# Patient Record
Sex: FEMALE | Race: WHITE | NOT HISPANIC OR LATINO | Employment: FULL TIME | URBAN - METROPOLITAN AREA
[De-identification: names, ages, dates, MRNs, and addresses within clinical notes are randomized per-mention and may not be internally consistent; named-entity substitution may affect disease eponyms.]

---

## 2021-06-02 ENCOUNTER — RECORDS - HEALTHEAST (OUTPATIENT)
Dept: ADMINISTRATIVE | Facility: CLINIC | Age: 26
End: 2021-06-02

## 2022-08-11 ENCOUNTER — LAB REQUISITION (OUTPATIENT)
Dept: LAB | Facility: CLINIC | Age: 27
End: 2022-08-11
Payer: COMMERCIAL

## 2022-08-11 DIAGNOSIS — N91.2 AMENORRHEA, UNSPECIFIED: ICD-10-CM

## 2022-08-11 LAB — HCG INTACT+B SERPL-ACNC: 967 MIU/ML

## 2022-08-11 PROCEDURE — 84702 CHORIONIC GONADOTROPIN TEST: CPT | Mod: ORL | Performed by: OBSTETRICS & GYNECOLOGY

## 2022-08-15 ENCOUNTER — LAB REQUISITION (OUTPATIENT)
Dept: LAB | Facility: CLINIC | Age: 27
End: 2022-08-15
Payer: COMMERCIAL

## 2022-08-15 DIAGNOSIS — Z32.01 ENCOUNTER FOR PREGNANCY TEST, RESULT POSITIVE: ICD-10-CM

## 2022-08-15 LAB — HCG INTACT+B SERPL-ACNC: 3442 MIU/ML

## 2022-08-15 PROCEDURE — 84702 CHORIONIC GONADOTROPIN TEST: CPT | Mod: ORL | Performed by: OBSTETRICS & GYNECOLOGY

## 2022-09-01 ENCOUNTER — HOSPITAL ENCOUNTER (EMERGENCY)
Facility: CLINIC | Age: 27
Discharge: HOME | End: 2022-09-01
Attending: EMERGENCY MEDICINE | Admitting: EMERGENCY MEDICINE
Payer: COMMERCIAL

## 2022-09-01 ENCOUNTER — APPOINTMENT (OUTPATIENT)
Dept: ULTRASOUND IMAGING | Facility: CLINIC | Age: 27
End: 2022-09-01
Attending: EMERGENCY MEDICINE
Payer: COMMERCIAL

## 2022-09-01 VITALS
DIASTOLIC BLOOD PRESSURE: 79 MMHG | SYSTOLIC BLOOD PRESSURE: 160 MMHG | WEIGHT: 215 LBS | BODY MASS INDEX: 40.59 KG/M2 | HEART RATE: 87 BPM | OXYGEN SATURATION: 95 % | RESPIRATION RATE: 18 BRPM | TEMPERATURE: 99.6 F | HEIGHT: 61 IN

## 2022-09-01 DIAGNOSIS — O20.9 VAGINAL BLEEDING AFFECTING EARLY PREGNANCY: ICD-10-CM

## 2022-09-01 LAB
ABO/RH(D): NORMAL
ANION GAP SERPL CALCULATED.3IONS-SCNC: 6 MMOL/L (ref 3–14)
ANTIBODY SCREEN: NEGATIVE
B-HCG SERPL-ACNC: ABNORMAL IU/L (ref 0–5)
BASOPHILS # BLD AUTO: 0 10E3/UL (ref 0–0.2)
BASOPHILS NFR BLD AUTO: 0 %
BUN SERPL-MCNC: 8 MG/DL (ref 7–30)
CALCIUM SERPL-MCNC: 8.8 MG/DL (ref 8.5–10.1)
CHLORIDE BLD-SCNC: 109 MMOL/L (ref 94–109)
CO2 SERPL-SCNC: 22 MMOL/L (ref 20–32)
CREAT SERPL-MCNC: 0.54 MG/DL (ref 0.52–1.04)
EOSINOPHIL # BLD AUTO: 0.2 10E3/UL (ref 0–0.7)
EOSINOPHIL NFR BLD AUTO: 3 %
ERYTHROCYTE [DISTWIDTH] IN BLOOD BY AUTOMATED COUNT: 12.5 % (ref 10–15)
GFR SERPL CREATININE-BSD FRML MDRD: >90 ML/MIN/1.73M2
GLUCOSE BLD-MCNC: 96 MG/DL (ref 70–99)
HCT VFR BLD AUTO: 37.8 % (ref 35–47)
HGB BLD-MCNC: 13.1 G/DL (ref 11.7–15.7)
IMM GRANULOCYTES # BLD: 0 10E3/UL
IMM GRANULOCYTES NFR BLD: 0 %
LYMPHOCYTES # BLD AUTO: 2 10E3/UL (ref 0.8–5.3)
LYMPHOCYTES NFR BLD AUTO: 25 %
MCH RBC QN AUTO: 31.1 PG (ref 26.5–33)
MCHC RBC AUTO-ENTMCNC: 34.7 G/DL (ref 31.5–36.5)
MCV RBC AUTO: 90 FL (ref 78–100)
MONOCYTES # BLD AUTO: 0.4 10E3/UL (ref 0–1.3)
MONOCYTES NFR BLD AUTO: 6 %
NEUTROPHILS # BLD AUTO: 5.2 10E3/UL (ref 1.6–8.3)
NEUTROPHILS NFR BLD AUTO: 66 %
NRBC # BLD AUTO: 0 10E3/UL
NRBC BLD AUTO-RTO: 0 /100
PLATELET # BLD AUTO: 209 10E3/UL (ref 150–450)
POTASSIUM BLD-SCNC: 3.6 MMOL/L (ref 3.4–5.3)
RBC # BLD AUTO: 4.21 10E6/UL (ref 3.8–5.2)
SODIUM SERPL-SCNC: 137 MMOL/L (ref 133–144)
SPECIMEN EXPIRATION DATE: NORMAL
WBC # BLD AUTO: 7.9 10E3/UL (ref 4–11)

## 2022-09-01 PROCEDURE — 82310 ASSAY OF CALCIUM: CPT | Performed by: EMERGENCY MEDICINE

## 2022-09-01 PROCEDURE — 76801 OB US < 14 WKS SINGLE FETUS: CPT

## 2022-09-01 PROCEDURE — 86850 RBC ANTIBODY SCREEN: CPT | Performed by: EMERGENCY MEDICINE

## 2022-09-01 PROCEDURE — 85025 COMPLETE CBC W/AUTO DIFF WBC: CPT | Performed by: EMERGENCY MEDICINE

## 2022-09-01 PROCEDURE — 99285 EMERGENCY DEPT VISIT HI MDM: CPT | Mod: 25

## 2022-09-01 PROCEDURE — 36415 COLL VENOUS BLD VENIPUNCTURE: CPT | Performed by: EMERGENCY MEDICINE

## 2022-09-01 PROCEDURE — 84702 CHORIONIC GONADOTROPIN TEST: CPT | Performed by: EMERGENCY MEDICINE

## 2022-09-01 PROCEDURE — 86901 BLOOD TYPING SEROLOGIC RH(D): CPT | Performed by: EMERGENCY MEDICINE

## 2022-09-01 ASSESSMENT — ENCOUNTER SYMPTOMS
LIGHT-HEADEDNESS: 0
FEVER: 0

## 2022-09-01 ASSESSMENT — ACTIVITIES OF DAILY LIVING (ADL)
ADLS_ACUITY_SCORE: 35
ADLS_ACUITY_SCORE: 35

## 2022-09-01 NOTE — ED PROVIDER NOTES
"  History   Chief Complaint:  Vaginal Bleeding - Pregnant       HPI   Judi Parisi is a  8 week pregnant 27 year old female who presents with vaginal bleeding since 1130 along. She has a reassuring US last week and has been seen by her OB GYN for this pregnancy.  She relays associated cramping.  She denies fevers or lightheadedness.  There are no further aggravating or alleviating factors no further associated symptoms.    Review of Systems   Constitutional: Negative for fever.   Genitourinary: Positive for vaginal bleeding.   Neurological: Negative for light-headedness.   All other systems reviewed and are negative.    Allergies:  No known drug allergies    Medications:  adderall     Past Medical History:     ADHD    Social History:  The patient presents to the ED with boyfriend    Physical Exam     Patient Vitals for the past 24 hrs:   BP Temp Temp src Pulse Resp SpO2 Height Weight   22 1148 -- -- -- 87 -- -- -- --   22 1147 (!) 160/79 99.6  F (37.6  C) Temporal -- 18 95 % 1.549 m (5' 1\") 97.5 kg (215 lb)       Physical Exam  General: Alert, interactive in mild distress  Head:  Scalp is atraumatic  Eyes:  The pupils are equal, round, and reactive to light    EOM's intact    No scleral icterus  ENT:      Nose:  The external nose is normal  Ears:  External ears are normal     Neck:  Normal range of motion.      There is no rigidity.    Trachea is in the midline         CV:  Regular rate and rhythm    No murmur   Resp:  Breath sounds are clear bilaterally    Non-labored, no retractions or accessory muscle use      GI:  Abdomen is soft, no distension, no tenderness.       MS:  Normal strength in all 4 extremities  Skin:  Warm and dry, No rash or lesions noted.  Neuro: Strength 5/5 x4.      GCS: 15  Psych:  Awake. Alert.  Normal affect.      Appropriate interactions.    Emergency Department Course   Imaging:  US OB <14 Weeks w Transvaginal   Preliminary Result   IMPRESSION:    1. Early single live " intrauterine pregnancy of approximately 7 weeks 4   days gestation. EDC is 4/16/2023.   2. Subchorionic hemorrhage identified.        Report per radiology    Laboratory:  Labs Ordered and Resulted from Time of ED Arrival to Time of ED Departure   HCG QUANTITATIVE PREGNANCY - Abnormal       Result Value    hCG Quantitative 35,017 (*)    BASIC METABOLIC PANEL - Normal    Sodium 137      Potassium 3.6      Chloride 109      Carbon Dioxide (CO2) 22      Anion Gap 6      Urea Nitrogen 8      Creatinine 0.54      Calcium 8.8      Glucose 96      GFR Estimate >90     CBC WITH PLATELETS AND DIFFERENTIAL    WBC Count 7.9      RBC Count 4.21      Hemoglobin 13.1      Hematocrit 37.8      MCV 90      MCH 31.1      MCHC 34.7      RDW 12.5      Platelet Count 209      % Neutrophils 66      % Lymphocytes 25      % Monocytes 6      % Eosinophils 3      % Basophils 0      % Immature Granulocytes 0      NRBCs per 100 WBC 0      Absolute Neutrophils 5.2      Absolute Lymphocytes 2.0      Absolute Monocytes 0.4      Absolute Eosinophils 0.2      Absolute Basophils 0.0      Absolute Immature Granulocytes 0.0      Absolute NRBCs 0.0     TYPE AND SCREEN, ADULT    ABO/RH(D) A POS      Antibody Screen Negative      SPECIMEN EXPIRATION DATE [id number]     ABO/RH TYPE AND SCREEN        Emergency Department Course:  Reviewed:  I reviewed nursing notes, vitals, past medical history and Care Everywhere    Assessments:  1158 I obtained history and examined the patient as noted above.   1342 I rechecked the patient and explained findings.     Disposition:  The patient was discharged to home.     Impression & Plan   Medical Decision Making:    Judi Parisi is a 27 year old female who presents for evaluation of vaginal bleeding in early pregnancy.  There is a subchorionic hemorrhage.  In this patient, there are no signs of serious etiologies of abdominal pain.  Supportive outpatient management is therefore indicated.  Plan is home, close  follow-up with OB, threatened miscarriage precautions, and return to ED for worsening pain, heavy vaginal bleeding (more than 1 pad soaked every hour).  Questions were answered.      Diagnosis:    ICD-10-CM    1. Vaginal bleeding affecting early pregnancy  O20.9    2. Subchorionic hemorrhage of placenta in first trimester, single or unspecified fetus  O41.8X10     O46.8X1        Scribe Disclosure:  I, Celestine Becerril, am serving as a scribe at 11:56 AM on 9/1/2022 to document services personally performed by Mj Dale, based on my observations and the provider's statements to me.         Mj Dale MD  09/01/22 8230

## 2022-09-01 NOTE — DISCHARGE INSTRUCTIONS
Discharge Instructions  Vaginal Bleeding in Pregnancy    Bleeding in early pregnancy can be a sign of a miscarriage or an abnormal pregnancy, but often is innocent and the pregnancy will continue normally. We may do blood pregnancy tests and ultrasound to try to determine what is causing the bleeding, but sometimes we are unable to tell. If this is the case, it will often require more time, more blood tests, and another ultrasound.     Generally, every Emergency Department visit should have a follow-up clinic visit with either a primary or a specialty clinic/provider. Please follow-up as instructed by your emergency provider today.    Return to the Emergency Department if:  You have severe abdominal (belly) or pelvic pain.  You faint, or feel lightheaded or dizzy.   Your bleeding gets much worse.  You pass any tissue--solid material that does not look like a blood clot. If you pass tissue, save it (even if you have to pull it out of the toilet) and put it in a plastic bag or jar and bring it in.    You have a fever of 100.5 F or higher.  If no pregnancy could be seen:  It may be that everything is normal and it is just too early to see the pregnancy. It is also possible that you could have an ectopic pregnancy, which is a pregnancy in an abnormal location, such as in the tube ( tubal pregnancy ). We don t see evidence that this is likely today but we cannot exclude it with certainty until a pregnancy can be seen in the uterus (womb) and an ectopic pregnancy can cause severe internal bleeding or death so follow-up is very important.  You should not be alone, in case you suddenly become very sick.   You should not have sex or put anything in your vagina.     If a pregnancy was seen in your uterus:  If a heartbeat could be seen, the chance of miscarriage is lower but because you had bleeding the chance of a miscarriage still exists.  You should not have sex or put anything in your vagina.  Follow-up as directed with  your OB/GYN provider.     Facts about miscarriage: We hope you do not have a miscarriage, but if you do, here are important things to know:  Early miscarriage is very common, and having one miscarriage does not mean you will have problems with another pregnancy.  Nothing you did caused it. Taking medicine, drinking alcohol, having sex, exercising, or falling down will not cause a miscarriage.     If you were given a prescription for medicine here today, be sure to read all of the information (including the package insert) that comes with your prescription.  This will include important information about the medicine, its side effects, and any warnings that you need to know about.  The pharmacist who fills the prescription can provide more information and answer questions you may have about the medicine.  If you have questions or concerns that the pharmacist cannot address, please call or return to the Emergency Department.   Remember that you can always come back to the Emergency Department if you are not able to see your regular provider in the amount of time listed above, if you get any new symptoms, or if there is anything that worries you.

## 2022-09-01 NOTE — ED TRIAGE NOTES
Pt is approx 8 weeks pregnant and started having bright red bleeding this morning. Some cramping. Had ultrasound on Monday and everything was good.      Triage Assessment     Row Name 09/01/22 1147       Respiratory WDL    Respiratory WDL WDL       Cardiac WDL    Cardiac WDL WDL       Cognitive/Neuro/Behavioral WDL    Cognitive/Neuro/Behavioral WDL WDL

## 2022-09-27 ENCOUNTER — LAB REQUISITION (OUTPATIENT)
Dept: LAB | Facility: CLINIC | Age: 27
End: 2022-09-27
Payer: COMMERCIAL

## 2022-09-27 DIAGNOSIS — Z33.1 PREGNANT STATE, INCIDENTAL: ICD-10-CM

## 2022-09-27 LAB
ERYTHROCYTE [DISTWIDTH] IN BLOOD BY AUTOMATED COUNT: 12.7 % (ref 10–15)
HBV SURFACE AG SERPL QL IA: NONREACTIVE
HCT VFR BLD AUTO: 37.2 % (ref 35–47)
HCV AB SERPL QL IA: NONREACTIVE
HGB BLD-MCNC: 12.9 G/DL (ref 11.7–15.7)
HIV 1+2 AB+HIV1 P24 AG SERPL QL IA: NONREACTIVE
MCH RBC QN AUTO: 31.4 PG (ref 26.5–33)
MCHC RBC AUTO-ENTMCNC: 34.7 G/DL (ref 31.5–36.5)
MCV RBC AUTO: 91 FL (ref 78–100)
PLATELET # BLD AUTO: 227 10E3/UL (ref 150–450)
RBC # BLD AUTO: 4.11 10E6/UL (ref 3.8–5.2)
T PALLIDUM AB SER QL: NONREACTIVE
WBC # BLD AUTO: 8.8 10E3/UL (ref 4–11)

## 2022-09-27 PROCEDURE — 85027 COMPLETE CBC AUTOMATED: CPT | Mod: ORL | Performed by: OBSTETRICS & GYNECOLOGY

## 2022-09-27 PROCEDURE — 86803 HEPATITIS C AB TEST: CPT | Mod: ORL | Performed by: OBSTETRICS & GYNECOLOGY

## 2022-09-27 PROCEDURE — 86780 TREPONEMA PALLIDUM: CPT | Mod: ORL | Performed by: OBSTETRICS & GYNECOLOGY

## 2022-09-27 PROCEDURE — 86762 RUBELLA ANTIBODY: CPT | Mod: ORL | Performed by: OBSTETRICS & GYNECOLOGY

## 2022-09-27 PROCEDURE — 87340 HEPATITIS B SURFACE AG IA: CPT | Mod: ORL | Performed by: OBSTETRICS & GYNECOLOGY

## 2022-09-27 PROCEDURE — 87389 HIV-1 AG W/HIV-1&-2 AB AG IA: CPT | Mod: ORL | Performed by: OBSTETRICS & GYNECOLOGY

## 2022-09-27 PROCEDURE — 87086 URINE CULTURE/COLONY COUNT: CPT | Mod: ORL | Performed by: OBSTETRICS & GYNECOLOGY

## 2022-09-28 LAB
BACTERIA UR CULT: ABNORMAL
BACTERIA UR CULT: ABNORMAL
RUBV IGG SERPL QL IA: 4.87 INDEX
RUBV IGG SERPL QL IA: POSITIVE

## 2022-10-25 ENCOUNTER — LAB REQUISITION (OUTPATIENT)
Dept: LAB | Facility: CLINIC | Age: 27
End: 2022-10-25
Payer: COMMERCIAL

## 2022-10-25 DIAGNOSIS — Z33.1 PREGNANT STATE, INCIDENTAL: ICD-10-CM

## 2022-10-25 LAB
ABO/RH(D): NORMAL
ANTIBODY SCREEN: NEGATIVE
SPECIMEN EXPIRATION DATE: NORMAL
SPECIMEN EXPIRATION DATE: NORMAL

## 2022-10-25 PROCEDURE — 86901 BLOOD TYPING SEROLOGIC RH(D): CPT | Mod: ORL | Performed by: OBSTETRICS & GYNECOLOGY

## 2022-10-25 PROCEDURE — 86850 RBC ANTIBODY SCREEN: CPT | Mod: ORL | Performed by: OBSTETRICS & GYNECOLOGY

## 2023-01-31 ENCOUNTER — TRANSFERRED RECORDS (OUTPATIENT)
Dept: HEALTH INFORMATION MANAGEMENT | Facility: CLINIC | Age: 28
End: 2023-01-31

## 2023-03-06 ENCOUNTER — HOSPITAL ENCOUNTER (INPATIENT)
Facility: HOSPITAL | Age: 28
LOS: 5 days | Discharge: HOME | End: 2023-03-11
Attending: OBSTETRICS & GYNECOLOGY | Admitting: OBSTETRICS & GYNECOLOGY
Payer: COMMERCIAL

## 2023-03-06 ENCOUNTER — APPOINTMENT (OUTPATIENT)
Dept: RADIOLOGY | Facility: HOSPITAL | Age: 28
End: 2023-03-06
Attending: OBSTETRICS & GYNECOLOGY
Payer: COMMERCIAL

## 2023-03-06 ENCOUNTER — HOSPITAL ENCOUNTER (EMERGENCY)
Facility: HOSPITAL | Age: 28
End: 2023-03-06
Payer: COMMERCIAL

## 2023-03-06 DIAGNOSIS — O14.13 SEVERE PRE-ECLAMPSIA IN THIRD TRIMESTER: Primary | ICD-10-CM

## 2023-03-06 PROBLEM — O14.10 SEVERE PREECLAMPSIA: Status: ACTIVE | Noted: 2023-03-06

## 2023-03-06 LAB
ABO/RH(D): NORMAL
ALBUMIN MFR UR ELPH: 169.2 MG/DL (ref 1–14)
ALT SERPL W P-5'-P-CCNC: 127 U/L (ref 10–35)
ANTIBODY SCREEN: NEGATIVE
AST SERPL W P-5'-P-CCNC: 86 U/L (ref 10–35)
CREAT SERPL-MCNC: 0.68 MG/DL (ref 0.51–0.95)
CREAT UR-MCNC: 29.5 MG/DL
ERYTHROCYTE [DISTWIDTH] IN BLOOD BY AUTOMATED COUNT: 13.4 % (ref 10–15)
GFR SERPL CREATININE-BSD FRML MDRD: >90 ML/MIN/1.73M2
HCT VFR BLD AUTO: 38.3 % (ref 35–47)
HGB BLD-MCNC: 13.5 G/DL (ref 11.7–15.7)
MCH RBC QN AUTO: 32.1 PG (ref 26.5–33)
MCHC RBC AUTO-ENTMCNC: 35.2 G/DL (ref 31.5–36.5)
MCV RBC AUTO: 91 FL (ref 78–100)
PLATELET # BLD AUTO: 177 10E3/UL (ref 150–450)
PROT/CREAT 24H UR: 5.74 MG/MG CR (ref 0–0.2)
RBC # BLD AUTO: 4.21 10E6/UL (ref 3.8–5.2)
SPECIMEN EXPIRATION DATE: NORMAL
WBC # BLD AUTO: 12.8 10E3/UL (ref 4–11)

## 2023-03-06 PROCEDURE — 250N000013 HC RX MED GY IP 250 OP 250 PS 637: Performed by: OBSTETRICS & GYNECOLOGY

## 2023-03-06 PROCEDURE — 3E0P7VZ INTRODUCTION OF HORMONE INTO FEMALE REPRODUCTIVE, VIA NATURAL OR ARTIFICIAL OPENING: ICD-10-PCS | Performed by: OBSTETRICS & GYNECOLOGY

## 2023-03-06 PROCEDURE — 82565 ASSAY OF CREATININE: CPT | Performed by: OBSTETRICS & GYNECOLOGY

## 2023-03-06 PROCEDURE — 71045 X-RAY EXAM CHEST 1 VIEW: CPT

## 2023-03-06 PROCEDURE — 84450 TRANSFERASE (AST) (SGOT): CPT | Performed by: OBSTETRICS & GYNECOLOGY

## 2023-03-06 PROCEDURE — 96372 THER/PROPH/DIAG INJ SC/IM: CPT | Performed by: OBSTETRICS & GYNECOLOGY

## 2023-03-06 PROCEDURE — 36415 COLL VENOUS BLD VENIPUNCTURE: CPT | Performed by: OBSTETRICS & GYNECOLOGY

## 2023-03-06 PROCEDURE — 999N000248 HC STATISTIC IV INSERT WITH US BY RN

## 2023-03-06 PROCEDURE — 250N000011 HC RX IP 250 OP 636: Performed by: OBSTETRICS & GYNECOLOGY

## 2023-03-06 PROCEDURE — 250N000013 HC RX MED GY IP 250 OP 250 PS 637

## 2023-03-06 PROCEDURE — 86850 RBC ANTIBODY SCREEN: CPT | Performed by: OBSTETRICS & GYNECOLOGY

## 2023-03-06 PROCEDURE — 84156 ASSAY OF PROTEIN URINE: CPT | Performed by: OBSTETRICS & GYNECOLOGY

## 2023-03-06 PROCEDURE — 84460 ALANINE AMINO (ALT) (SGPT): CPT | Performed by: OBSTETRICS & GYNECOLOGY

## 2023-03-06 PROCEDURE — 86901 BLOOD TYPING SEROLOGIC RH(D): CPT | Performed by: OBSTETRICS & GYNECOLOGY

## 2023-03-06 PROCEDURE — 258N000003 HC RX IP 258 OP 636: Performed by: OBSTETRICS & GYNECOLOGY

## 2023-03-06 PROCEDURE — 85027 COMPLETE CBC AUTOMATED: CPT | Performed by: OBSTETRICS & GYNECOLOGY

## 2023-03-06 PROCEDURE — 120N000001 HC R&B MED SURG/OB

## 2023-03-06 RX ORDER — CARBOPROST TROMETHAMINE 250 UG/ML
250 INJECTION, SOLUTION INTRAMUSCULAR
Status: DISCONTINUED | OUTPATIENT
Start: 2023-03-06 | End: 2023-03-07 | Stop reason: HOSPADM

## 2023-03-06 RX ORDER — MAGNESIUM SULFATE 4 G/50ML
4 INJECTION INTRAVENOUS
Status: DISCONTINUED | OUTPATIENT
Start: 2023-03-06 | End: 2023-03-11 | Stop reason: HOSPADM

## 2023-03-06 RX ORDER — KETOROLAC TROMETHAMINE 30 MG/ML
30 INJECTION, SOLUTION INTRAMUSCULAR; INTRAVENOUS
Status: DISCONTINUED | OUTPATIENT
Start: 2023-03-06 | End: 2023-03-07

## 2023-03-06 RX ORDER — ONDANSETRON 2 MG/ML
4 INJECTION INTRAMUSCULAR; INTRAVENOUS EVERY 6 HOURS PRN
Status: DISCONTINUED | OUTPATIENT
Start: 2023-03-06 | End: 2023-03-07

## 2023-03-06 RX ORDER — NALOXONE HYDROCHLORIDE 0.4 MG/ML
0.4 INJECTION, SOLUTION INTRAMUSCULAR; INTRAVENOUS; SUBCUTANEOUS
Status: DISCONTINUED | OUTPATIENT
Start: 2023-03-06 | End: 2023-03-07 | Stop reason: HOSPADM

## 2023-03-06 RX ORDER — OXYTOCIN/0.9 % SODIUM CHLORIDE 30/500 ML
100-340 PLASTIC BAG, INJECTION (ML) INTRAVENOUS CONTINUOUS PRN
Status: DISCONTINUED | OUTPATIENT
Start: 2023-03-06 | End: 2023-03-07

## 2023-03-06 RX ORDER — METOCLOPRAMIDE HYDROCHLORIDE 5 MG/ML
10 INJECTION INTRAMUSCULAR; INTRAVENOUS EVERY 6 HOURS PRN
Status: DISCONTINUED | OUTPATIENT
Start: 2023-03-06 | End: 2023-03-07 | Stop reason: HOSPADM

## 2023-03-06 RX ORDER — CALCIUM GLUCONATE 94 MG/ML
1 INJECTION, SOLUTION INTRAVENOUS
Status: DISCONTINUED | OUTPATIENT
Start: 2023-03-06 | End: 2023-03-07

## 2023-03-06 RX ORDER — MISOPROSTOL 200 UG/1
400 TABLET ORAL
Status: DISCONTINUED | OUTPATIENT
Start: 2023-03-06 | End: 2023-03-07 | Stop reason: HOSPADM

## 2023-03-06 RX ORDER — ONDANSETRON 4 MG/1
4 TABLET, ORALLY DISINTEGRATING ORAL EVERY 6 HOURS PRN
Status: DISCONTINUED | OUTPATIENT
Start: 2023-03-06 | End: 2023-03-07

## 2023-03-06 RX ORDER — MAGNESIUM SULFATE HEPTAHYDRATE 40 MG/ML
2 INJECTION, SOLUTION INTRAVENOUS
Status: DISCONTINUED | OUTPATIENT
Start: 2023-03-06 | End: 2023-03-07

## 2023-03-06 RX ORDER — MAGNESIUM SULFATE IN WATER 40 MG/ML
2 INJECTION, SOLUTION INTRAVENOUS CONTINUOUS
Status: DISCONTINUED | OUTPATIENT
Start: 2023-03-06 | End: 2023-03-07

## 2023-03-06 RX ORDER — OXYTOCIN 10 [USP'U]/ML
10 INJECTION, SOLUTION INTRAMUSCULAR; INTRAVENOUS
Status: DISCONTINUED | OUTPATIENT
Start: 2023-03-06 | End: 2023-03-07

## 2023-03-06 RX ORDER — NALOXONE HYDROCHLORIDE 0.4 MG/ML
0.2 INJECTION, SOLUTION INTRAMUSCULAR; INTRAVENOUS; SUBCUTANEOUS
Status: DISCONTINUED | OUTPATIENT
Start: 2023-03-06 | End: 2023-03-07 | Stop reason: HOSPADM

## 2023-03-06 RX ORDER — PROCHLORPERAZINE 25 MG
25 SUPPOSITORY, RECTAL RECTAL EVERY 12 HOURS PRN
Status: DISCONTINUED | OUTPATIENT
Start: 2023-03-06 | End: 2023-03-07

## 2023-03-06 RX ORDER — PENICILLIN G POTASSIUM 5000000 [IU]/1
5 INJECTION, POWDER, FOR SOLUTION INTRAMUSCULAR; INTRAVENOUS ONCE
Status: DISCONTINUED | OUTPATIENT
Start: 2023-03-06 | End: 2023-03-07

## 2023-03-06 RX ORDER — LABETALOL HYDROCHLORIDE 5 MG/ML
20 INJECTION, SOLUTION INTRAVENOUS
Status: DISCONTINUED | OUTPATIENT
Start: 2023-03-06 | End: 2023-03-07

## 2023-03-06 RX ORDER — MISOPROSTOL 200 UG/1
800 TABLET ORAL
Status: DISCONTINUED | OUTPATIENT
Start: 2023-03-06 | End: 2023-03-07 | Stop reason: HOSPADM

## 2023-03-06 RX ORDER — OXYTOCIN/0.9 % SODIUM CHLORIDE 30/500 ML
340 PLASTIC BAG, INJECTION (ML) INTRAVENOUS CONTINUOUS PRN
Status: DISCONTINUED | OUTPATIENT
Start: 2023-03-06 | End: 2023-03-07 | Stop reason: HOSPADM

## 2023-03-06 RX ORDER — METHYLERGONOVINE MALEATE 0.2 MG/ML
200 INJECTION INTRAVENOUS
Status: DISCONTINUED | OUTPATIENT
Start: 2023-03-06 | End: 2023-03-07 | Stop reason: HOSPADM

## 2023-03-06 RX ORDER — BETAMETHASONE SODIUM PHOSPHATE AND BETAMETHASONE ACETATE 3; 3 MG/ML; MG/ML
12 INJECTION, SUSPENSION INTRA-ARTICULAR; INTRALESIONAL; INTRAMUSCULAR; SOFT TISSUE DAILY
Status: DISCONTINUED | OUTPATIENT
Start: 2023-03-06 | End: 2023-03-07

## 2023-03-06 RX ORDER — SODIUM CHLORIDE, SODIUM LACTATE, POTASSIUM CHLORIDE, CALCIUM CHLORIDE 600; 310; 30; 20 MG/100ML; MG/100ML; MG/100ML; MG/100ML
10-125 INJECTION, SOLUTION INTRAVENOUS CONTINUOUS
Status: DISCONTINUED | OUTPATIENT
Start: 2023-03-06 | End: 2023-03-07

## 2023-03-06 RX ORDER — METOCLOPRAMIDE 10 MG/1
10 TABLET ORAL EVERY 6 HOURS PRN
Status: DISCONTINUED | OUTPATIENT
Start: 2023-03-06 | End: 2023-03-07 | Stop reason: HOSPADM

## 2023-03-06 RX ORDER — IBUPROFEN 800 MG/1
800 TABLET, FILM COATED ORAL
Status: DISCONTINUED | OUTPATIENT
Start: 2023-03-06 | End: 2023-03-07

## 2023-03-06 RX ORDER — MAGNESIUM SULFATE 4 G/50ML
4 INJECTION INTRAVENOUS ONCE
Status: COMPLETED | OUTPATIENT
Start: 2023-03-06 | End: 2023-03-06

## 2023-03-06 RX ORDER — NIFEDIPINE 10 MG/1
10-20 CAPSULE ORAL
Status: DISCONTINUED | OUTPATIENT
Start: 2023-03-06 | End: 2023-03-07

## 2023-03-06 RX ORDER — NIFEDIPINE 10 MG/1
CAPSULE ORAL
Status: COMPLETED
Start: 2023-03-06 | End: 2023-03-06

## 2023-03-06 RX ORDER — ACETAMINOPHEN 325 MG/1
975 TABLET ORAL EVERY 6 HOURS PRN
Status: DISCONTINUED | OUTPATIENT
Start: 2023-03-06 | End: 2023-03-07

## 2023-03-06 RX ORDER — PENICILLIN G 3000000 [IU]/50ML
3 INJECTION, SOLUTION INTRAVENOUS EVERY 4 HOURS
Status: DISCONTINUED | OUTPATIENT
Start: 2023-03-07 | End: 2023-03-07

## 2023-03-06 RX ORDER — MAGNESIUM SULFATE HEPTAHYDRATE 500 MG/ML
10 INJECTION, SOLUTION INTRAMUSCULAR; INTRAVENOUS
Status: DISCONTINUED | OUTPATIENT
Start: 2023-03-06 | End: 2023-03-07

## 2023-03-06 RX ORDER — PROCHLORPERAZINE MALEATE 10 MG
10 TABLET ORAL EVERY 6 HOURS PRN
Status: DISCONTINUED | OUTPATIENT
Start: 2023-03-06 | End: 2023-03-07

## 2023-03-06 RX ORDER — MISOPROSTOL 100 UG/1
25 TABLET ORAL
Status: DISCONTINUED | OUTPATIENT
Start: 2023-03-06 | End: 2023-03-07

## 2023-03-06 RX ORDER — LABETALOL HYDROCHLORIDE 5 MG/ML
INJECTION, SOLUTION INTRAVENOUS
Status: DISCONTINUED
Start: 2023-03-06 | End: 2023-03-07 | Stop reason: HOSPADM

## 2023-03-06 RX ORDER — LORAZEPAM 2 MG/ML
2 INJECTION INTRAMUSCULAR
Status: DISCONTINUED | OUTPATIENT
Start: 2023-03-06 | End: 2023-03-07

## 2023-03-06 RX ORDER — CITRIC ACID/SODIUM CITRATE 334-500MG
30 SOLUTION, ORAL ORAL
Status: DISCONTINUED | OUTPATIENT
Start: 2023-03-06 | End: 2023-03-07

## 2023-03-06 RX ADMIN — MAGNESIUM SULFATE HEPTAHYDRATE 2 G/HR: 40 INJECTION, SOLUTION INTRAVENOUS at 20:47

## 2023-03-06 RX ADMIN — NIFEDIPINE 10 MG: 10 CAPSULE ORAL at 20:10

## 2023-03-06 RX ADMIN — NIFEDIPINE 20 MG: 10 CAPSULE ORAL at 20:34

## 2023-03-06 RX ADMIN — NIFEDIPINE 20 MG: 10 CAPSULE ORAL at 21:57

## 2023-03-06 RX ADMIN — SODIUM CHLORIDE, POTASSIUM CHLORIDE, SODIUM LACTATE AND CALCIUM CHLORIDE 25 ML/HR: 600; 310; 30; 20 INJECTION, SOLUTION INTRAVENOUS at 20:13

## 2023-03-06 RX ADMIN — BETAMETHASONE ACETATE AND BETAMETHASONE SODIUM PHOSPHATE 12 MG: 3; 3 INJECTION, SUSPENSION INTRA-ARTICULAR; INTRALESIONAL; INTRAMUSCULAR; SOFT TISSUE at 20:45

## 2023-03-06 RX ADMIN — MISOPROSTOL 25 MCG: 100 TABLET ORAL at 22:40

## 2023-03-06 RX ADMIN — ACETAMINOPHEN 975 MG: 325 TABLET ORAL at 21:24

## 2023-03-06 RX ADMIN — NIFEDIPINE 10 MG: 10 CAPSULE ORAL at 21:35

## 2023-03-06 RX ADMIN — MAGNESIUM SULFATE HEPTAHYDRATE 4 G: 80 INJECTION, SOLUTION INTRAVENOUS at 20:25

## 2023-03-06 ASSESSMENT — ACTIVITIES OF DAILY LIVING (ADL)
ADLS_ACUITY_SCORE: 35
ADLS_ACUITY_SCORE: 18

## 2023-03-07 ENCOUNTER — ANCILLARY PROCEDURE (OUTPATIENT)
Dept: ULTRASOUND IMAGING | Facility: HOSPITAL | Age: 28
End: 2023-03-07
Attending: ANESTHESIOLOGY
Payer: COMMERCIAL

## 2023-03-07 ENCOUNTER — ANESTHESIA (OUTPATIENT)
Dept: OBGYN | Facility: HOSPITAL | Age: 28
End: 2023-03-07
Payer: COMMERCIAL

## 2023-03-07 ENCOUNTER — ANESTHESIA EVENT (OUTPATIENT)
Dept: OBGYN | Facility: HOSPITAL | Age: 28
End: 2023-03-07
Payer: COMMERCIAL

## 2023-03-07 LAB
ALT SERPL W P-5'-P-CCNC: 140 U/L (ref 10–35)
ALT SERPL W P-5'-P-CCNC: 156 U/L (ref 10–35)
APTT PPP: 24 SECONDS (ref 22–38)
AST SERPL W P-5'-P-CCNC: 103 U/L (ref 10–35)
AST SERPL W P-5'-P-CCNC: 93 U/L (ref 10–35)
CREAT SERPL-MCNC: 0.64 MG/DL (ref 0.51–0.95)
ERYTHROCYTE [DISTWIDTH] IN BLOOD BY AUTOMATED COUNT: 13.2 % (ref 10–15)
ERYTHROCYTE [DISTWIDTH] IN BLOOD BY AUTOMATED COUNT: 13.3 % (ref 10–15)
GFR SERPL CREATININE-BSD FRML MDRD: >90 ML/MIN/1.73M2
HCT VFR BLD AUTO: 37.5 % (ref 35–47)
HCT VFR BLD AUTO: 39.9 % (ref 35–47)
HGB BLD-MCNC: 13.2 G/DL (ref 11.7–15.7)
HGB BLD-MCNC: 13.7 G/DL (ref 11.7–15.7)
HGB BLD-MCNC: 14.4 G/DL (ref 11.7–15.7)
INR PPP: 0.92 (ref 0.85–1.15)
MCH RBC QN AUTO: 32 PG (ref 26.5–33)
MCH RBC QN AUTO: 32.3 PG (ref 26.5–33)
MCHC RBC AUTO-ENTMCNC: 35.2 G/DL (ref 31.5–36.5)
MCHC RBC AUTO-ENTMCNC: 36.1 G/DL (ref 31.5–36.5)
MCV RBC AUTO: 90 FL (ref 78–100)
MCV RBC AUTO: 91 FL (ref 78–100)
PLATELET # BLD AUTO: 152 10E3/UL (ref 150–450)
PLATELET # BLD AUTO: 159 10E3/UL (ref 150–450)
PLATELET # BLD AUTO: 178 10E3/UL (ref 150–450)
RBC # BLD AUTO: 4.13 10E6/UL (ref 3.8–5.2)
RBC # BLD AUTO: 4.46 10E6/UL (ref 3.8–5.2)
WBC # BLD AUTO: 13.6 10E3/UL (ref 4–11)
WBC # BLD AUTO: 17.2 10E3/UL (ref 4–11)

## 2023-03-07 PROCEDURE — 250N000013 HC RX MED GY IP 250 OP 250 PS 637: Performed by: ANESTHESIOLOGY

## 2023-03-07 PROCEDURE — 88307 TISSUE EXAM BY PATHOLOGIST: CPT | Mod: 26 | Performed by: PATHOLOGY

## 2023-03-07 PROCEDURE — 85049 AUTOMATED PLATELET COUNT: CPT | Performed by: OBSTETRICS & GYNECOLOGY

## 2023-03-07 PROCEDURE — 84460 ALANINE AMINO (ALT) (SGPT): CPT | Performed by: OBSTETRICS & GYNECOLOGY

## 2023-03-07 PROCEDURE — 258N000003 HC RX IP 258 OP 636: Performed by: OBSTETRICS & GYNECOLOGY

## 2023-03-07 PROCEDURE — 250N000011 HC RX IP 250 OP 636: Performed by: OBSTETRICS & GYNECOLOGY

## 2023-03-07 PROCEDURE — 85610 PROTHROMBIN TIME: CPT | Performed by: OBSTETRICS & GYNECOLOGY

## 2023-03-07 PROCEDURE — 88307 TISSUE EXAM BY PATHOLOGIST: CPT | Mod: TC | Performed by: OBSTETRICS & GYNECOLOGY

## 2023-03-07 PROCEDURE — 85018 HEMOGLOBIN: CPT | Performed by: OBSTETRICS & GYNECOLOGY

## 2023-03-07 PROCEDURE — 250N000009 HC RX 250: Performed by: NURSE ANESTHETIST, CERTIFIED REGISTERED

## 2023-03-07 PROCEDURE — 250N000011 HC RX IP 250 OP 636: Performed by: ANESTHESIOLOGY

## 2023-03-07 PROCEDURE — C9290 INJ, BUPIVACAINE LIPOSOME: HCPCS | Performed by: ANESTHESIOLOGY

## 2023-03-07 PROCEDURE — 36415 COLL VENOUS BLD VENIPUNCTURE: CPT | Performed by: OBSTETRICS & GYNECOLOGY

## 2023-03-07 PROCEDURE — 999N000249 HC STATISTIC C-SECTION ON UNIT

## 2023-03-07 PROCEDURE — 272N000001 HC OR GENERAL SUPPLY STERILE: Performed by: OBSTETRICS & GYNECOLOGY

## 2023-03-07 PROCEDURE — 120N000001 HC R&B MED SURG/OB

## 2023-03-07 PROCEDURE — 360N000076 HC SURGERY LEVEL 3, PER MIN: Performed by: OBSTETRICS & GYNECOLOGY

## 2023-03-07 PROCEDURE — 258N000003 HC RX IP 258 OP 636: Performed by: NURSE ANESTHETIST, CERTIFIED REGISTERED

## 2023-03-07 PROCEDURE — 85730 THROMBOPLASTIN TIME PARTIAL: CPT | Performed by: OBSTETRICS & GYNECOLOGY

## 2023-03-07 PROCEDURE — 82565 ASSAY OF CREATININE: CPT | Performed by: OBSTETRICS & GYNECOLOGY

## 2023-03-07 PROCEDURE — 250N000013 HC RX MED GY IP 250 OP 250 PS 637: Performed by: OBSTETRICS & GYNECOLOGY

## 2023-03-07 PROCEDURE — 370N000017 HC ANESTHESIA TECHNICAL FEE, PER MIN: Performed by: OBSTETRICS & GYNECOLOGY

## 2023-03-07 PROCEDURE — 250N000011 HC RX IP 250 OP 636: Performed by: NURSE ANESTHETIST, CERTIFIED REGISTERED

## 2023-03-07 PROCEDURE — 85014 HEMATOCRIT: CPT | Performed by: OBSTETRICS & GYNECOLOGY

## 2023-03-07 PROCEDURE — 84450 TRANSFERASE (AST) (SGOT): CPT | Performed by: OBSTETRICS & GYNECOLOGY

## 2023-03-07 RX ORDER — HALOPERIDOL 5 MG/ML
1 INJECTION INTRAMUSCULAR
Status: CANCELLED | OUTPATIENT
Start: 2023-03-07

## 2023-03-07 RX ORDER — KETOROLAC TROMETHAMINE 30 MG/ML
15 INJECTION, SOLUTION INTRAMUSCULAR; INTRAVENOUS
Status: CANCELLED | OUTPATIENT
Start: 2023-03-07

## 2023-03-07 RX ORDER — CALCIUM GLUCONATE 94 MG/ML
1 INJECTION, SOLUTION INTRAVENOUS
Status: DISCONTINUED | OUTPATIENT
Start: 2023-03-07 | End: 2023-03-11 | Stop reason: HOSPADM

## 2023-03-07 RX ORDER — CEFAZOLIN SODIUM/WATER 2 G/20 ML
2 SYRINGE (ML) INTRAVENOUS
Status: COMPLETED | OUTPATIENT
Start: 2023-03-07 | End: 2023-03-07

## 2023-03-07 RX ORDER — NALOXONE HYDROCHLORIDE 0.4 MG/ML
0.2 INJECTION, SOLUTION INTRAMUSCULAR; INTRAVENOUS; SUBCUTANEOUS
Status: DISCONTINUED | OUTPATIENT
Start: 2023-03-07 | End: 2023-03-11 | Stop reason: HOSPADM

## 2023-03-07 RX ORDER — FENTANYL CITRATE 50 UG/ML
50 INJECTION, SOLUTION INTRAMUSCULAR; INTRAVENOUS EVERY 5 MIN PRN
Status: CANCELLED | OUTPATIENT
Start: 2023-03-07

## 2023-03-07 RX ORDER — ONDANSETRON 4 MG/1
4 TABLET, ORALLY DISINTEGRATING ORAL EVERY 30 MIN PRN
Status: CANCELLED | OUTPATIENT
Start: 2023-03-07

## 2023-03-07 RX ORDER — AMOXICILLIN 250 MG
2 CAPSULE ORAL 2 TIMES DAILY
Status: DISCONTINUED | OUTPATIENT
Start: 2023-03-07 | End: 2023-03-11 | Stop reason: HOSPADM

## 2023-03-07 RX ORDER — MEPERIDINE HYDROCHLORIDE 25 MG/ML
12.5 INJECTION INTRAMUSCULAR; INTRAVENOUS; SUBCUTANEOUS EVERY 5 MIN PRN
Status: CANCELLED | OUTPATIENT
Start: 2023-03-07

## 2023-03-07 RX ORDER — SODIUM CHLORIDE, SODIUM LACTATE, POTASSIUM CHLORIDE, CALCIUM CHLORIDE 600; 310; 30; 20 MG/100ML; MG/100ML; MG/100ML; MG/100ML
10-125 INJECTION, SOLUTION INTRAVENOUS CONTINUOUS
Status: DISCONTINUED | OUTPATIENT
Start: 2023-03-07 | End: 2023-03-11 | Stop reason: HOSPADM

## 2023-03-07 RX ORDER — LIDOCAINE 40 MG/G
CREAM TOPICAL
Status: DISCONTINUED | OUTPATIENT
Start: 2023-03-07 | End: 2023-03-07 | Stop reason: HOSPADM

## 2023-03-07 RX ORDER — AMOXICILLIN 250 MG
1 CAPSULE ORAL 2 TIMES DAILY
Status: DISCONTINUED | OUTPATIENT
Start: 2023-03-07 | End: 2023-03-11 | Stop reason: HOSPADM

## 2023-03-07 RX ORDER — ONDANSETRON 2 MG/ML
4 INJECTION INTRAMUSCULAR; INTRAVENOUS EVERY 6 HOURS PRN
Status: DISCONTINUED | OUTPATIENT
Start: 2023-03-07 | End: 2023-03-07 | Stop reason: HOSPADM

## 2023-03-07 RX ORDER — PROCHLORPERAZINE 25 MG
25 SUPPOSITORY, RECTAL RECTAL EVERY 12 HOURS PRN
Status: DISCONTINUED | OUTPATIENT
Start: 2023-03-07 | End: 2023-03-11 | Stop reason: HOSPADM

## 2023-03-07 RX ORDER — METHYLERGONOVINE MALEATE 0.2 MG/ML
200 INJECTION INTRAVENOUS
Status: DISCONTINUED | OUTPATIENT
Start: 2023-03-07 | End: 2023-03-11 | Stop reason: HOSPADM

## 2023-03-07 RX ORDER — BISACODYL 10 MG
10 SUPPOSITORY, RECTAL RECTAL DAILY PRN
Status: DISCONTINUED | OUTPATIENT
Start: 2023-03-09 | End: 2023-03-11 | Stop reason: HOSPADM

## 2023-03-07 RX ORDER — MORPHINE SULFATE 0.5 MG/ML
INJECTION, SOLUTION EPIDURAL; INTRATHECAL; INTRAVENOUS PRN
Status: DISCONTINUED | OUTPATIENT
Start: 2023-03-07 | End: 2023-03-07

## 2023-03-07 RX ORDER — NALBUPHINE HYDROCHLORIDE 10 MG/ML
2.5-5 INJECTION, SOLUTION INTRAMUSCULAR; INTRAVENOUS; SUBCUTANEOUS EVERY 6 HOURS PRN
Status: DISCONTINUED | OUTPATIENT
Start: 2023-03-07 | End: 2023-03-11 | Stop reason: HOSPADM

## 2023-03-07 RX ORDER — DIPHENHYDRAMINE HYDROCHLORIDE 50 MG/ML
12.5 INJECTION INTRAMUSCULAR; INTRAVENOUS ONCE
Status: COMPLETED | OUTPATIENT
Start: 2023-03-07 | End: 2023-03-07

## 2023-03-07 RX ORDER — OXYTOCIN/0.9 % SODIUM CHLORIDE 30/500 ML
340 PLASTIC BAG, INJECTION (ML) INTRAVENOUS CONTINUOUS PRN
Status: DISCONTINUED | OUTPATIENT
Start: 2023-03-07 | End: 2023-03-07 | Stop reason: HOSPADM

## 2023-03-07 RX ORDER — CARBOPROST TROMETHAMINE 250 UG/ML
250 INJECTION, SOLUTION INTRAMUSCULAR
Status: DISCONTINUED | OUTPATIENT
Start: 2023-03-07 | End: 2023-03-07 | Stop reason: HOSPADM

## 2023-03-07 RX ORDER — MISOPROSTOL 200 UG/1
400 TABLET ORAL
Status: DISCONTINUED | OUTPATIENT
Start: 2023-03-07 | End: 2023-03-07 | Stop reason: HOSPADM

## 2023-03-07 RX ORDER — NALOXONE HYDROCHLORIDE 0.4 MG/ML
0.4 INJECTION, SOLUTION INTRAMUSCULAR; INTRAVENOUS; SUBCUTANEOUS
Status: DISCONTINUED | OUTPATIENT
Start: 2023-03-07 | End: 2023-03-11 | Stop reason: HOSPADM

## 2023-03-07 RX ORDER — MAGNESIUM SULFATE 4 G/50ML
4 INJECTION INTRAVENOUS
Status: DISCONTINUED | OUTPATIENT
Start: 2023-03-07 | End: 2023-03-11 | Stop reason: HOSPADM

## 2023-03-07 RX ORDER — OXYTOCIN 10 [USP'U]/ML
10 INJECTION, SOLUTION INTRAMUSCULAR; INTRAVENOUS
Status: DISCONTINUED | OUTPATIENT
Start: 2023-03-07 | End: 2023-03-11 | Stop reason: HOSPADM

## 2023-03-07 RX ORDER — SODIUM CHLORIDE, SODIUM LACTATE, POTASSIUM CHLORIDE, CALCIUM CHLORIDE 600; 310; 30; 20 MG/100ML; MG/100ML; MG/100ML; MG/100ML
INJECTION, SOLUTION INTRAVENOUS CONTINUOUS
Status: DISCONTINUED | OUTPATIENT
Start: 2023-03-07 | End: 2023-03-07 | Stop reason: HOSPADM

## 2023-03-07 RX ORDER — NIFEDIPINE 10 MG/1
10-20 CAPSULE ORAL
Status: DISCONTINUED | OUTPATIENT
Start: 2023-03-07 | End: 2023-03-11 | Stop reason: HOSPADM

## 2023-03-07 RX ORDER — NIFEDIPINE 30 MG
30 TABLET, EXTENDED RELEASE ORAL DAILY
Status: DISCONTINUED | OUTPATIENT
Start: 2023-03-07 | End: 2023-03-07

## 2023-03-07 RX ORDER — ONDANSETRON 2 MG/ML
INJECTION INTRAMUSCULAR; INTRAVENOUS PRN
Status: DISCONTINUED | OUTPATIENT
Start: 2023-03-07 | End: 2023-03-07

## 2023-03-07 RX ORDER — ONDANSETRON 4 MG/1
4 TABLET, ORALLY DISINTEGRATING ORAL EVERY 6 HOURS PRN
Status: DISCONTINUED | OUTPATIENT
Start: 2023-03-07 | End: 2023-03-11 | Stop reason: HOSPADM

## 2023-03-07 RX ORDER — METOCLOPRAMIDE HYDROCHLORIDE 5 MG/ML
10 INJECTION INTRAMUSCULAR; INTRAVENOUS EVERY 6 HOURS PRN
Status: DISCONTINUED | OUTPATIENT
Start: 2023-03-07 | End: 2023-03-11 | Stop reason: HOSPADM

## 2023-03-07 RX ORDER — FENTANYL CITRATE-0.9 % NACL/PF 10 MCG/ML
100 PLASTIC BAG, INJECTION (ML) INTRAVENOUS EVERY 5 MIN PRN
Status: DISCONTINUED | OUTPATIENT
Start: 2023-03-07 | End: 2023-03-07 | Stop reason: HOSPADM

## 2023-03-07 RX ORDER — HYDROCORTISONE 25 MG/G
CREAM TOPICAL 3 TIMES DAILY PRN
Status: DISCONTINUED | OUTPATIENT
Start: 2023-03-07 | End: 2023-03-11 | Stop reason: HOSPADM

## 2023-03-07 RX ORDER — ONDANSETRON 4 MG/1
4 TABLET, ORALLY DISINTEGRATING ORAL EVERY 6 HOURS PRN
Status: DISCONTINUED | OUTPATIENT
Start: 2023-03-07 | End: 2023-03-07 | Stop reason: HOSPADM

## 2023-03-07 RX ORDER — BUPIVACAINE HYDROCHLORIDE 2.5 MG/ML
INJECTION, SOLUTION EPIDURAL; INFILTRATION; INTRACAUDAL
Status: COMPLETED | OUTPATIENT
Start: 2023-03-07 | End: 2023-03-07

## 2023-03-07 RX ORDER — DIPHENHYDRAMINE HCL 25 MG
25 CAPSULE ORAL ONCE
Status: COMPLETED | OUTPATIENT
Start: 2023-03-07 | End: 2023-03-07

## 2023-03-07 RX ORDER — ACETAMINOPHEN 325 MG/1
975 TABLET ORAL ONCE
Status: DISCONTINUED | OUTPATIENT
Start: 2023-03-07 | End: 2023-03-07

## 2023-03-07 RX ORDER — ACETAMINOPHEN 325 MG/1
975 TABLET ORAL ONCE
Status: COMPLETED | OUTPATIENT
Start: 2023-03-07 | End: 2023-03-07

## 2023-03-07 RX ORDER — ENOXAPARIN SODIUM 100 MG/ML
40 INJECTION SUBCUTANEOUS EVERY 12 HOURS
Status: DISCONTINUED | OUTPATIENT
Start: 2023-03-07 | End: 2023-03-11 | Stop reason: HOSPADM

## 2023-03-07 RX ORDER — NIFEDIPINE 30 MG
30 TABLET, EXTENDED RELEASE ORAL DAILY
Status: DISCONTINUED | OUTPATIENT
Start: 2023-03-07 | End: 2023-03-09

## 2023-03-07 RX ORDER — OXYTOCIN/0.9 % SODIUM CHLORIDE 30/500 ML
340 PLASTIC BAG, INJECTION (ML) INTRAVENOUS CONTINUOUS PRN
Status: DISCONTINUED | OUTPATIENT
Start: 2023-03-07 | End: 2023-03-11 | Stop reason: HOSPADM

## 2023-03-07 RX ORDER — SODIUM CHLORIDE, SODIUM LACTATE, POTASSIUM CHLORIDE, CALCIUM CHLORIDE 600; 310; 30; 20 MG/100ML; MG/100ML; MG/100ML; MG/100ML
INJECTION, SOLUTION INTRAVENOUS CONTINUOUS PRN
Status: DISCONTINUED | OUTPATIENT
Start: 2023-03-07 | End: 2023-03-07

## 2023-03-07 RX ORDER — MISOPROSTOL 200 UG/1
800 TABLET ORAL
Status: DISCONTINUED | OUTPATIENT
Start: 2023-03-07 | End: 2023-03-11 | Stop reason: HOSPADM

## 2023-03-07 RX ORDER — PROCHLORPERAZINE MALEATE 10 MG
10 TABLET ORAL EVERY 6 HOURS PRN
Status: DISCONTINUED | OUTPATIENT
Start: 2023-03-07 | End: 2023-03-11 | Stop reason: HOSPADM

## 2023-03-07 RX ORDER — SODIUM CHLORIDE, SODIUM LACTATE, POTASSIUM CHLORIDE, CALCIUM CHLORIDE 600; 310; 30; 20 MG/100ML; MG/100ML; MG/100ML; MG/100ML
INJECTION, SOLUTION INTRAVENOUS CONTINUOUS
Status: DISCONTINUED | OUTPATIENT
Start: 2023-03-07 | End: 2023-03-07

## 2023-03-07 RX ORDER — LORAZEPAM 2 MG/ML
2 INJECTION INTRAMUSCULAR
Status: DISCONTINUED | OUTPATIENT
Start: 2023-03-07 | End: 2023-03-11 | Stop reason: HOSPADM

## 2023-03-07 RX ORDER — OXYTOCIN/0.9 % SODIUM CHLORIDE 30/500 ML
100-340 PLASTIC BAG, INJECTION (ML) INTRAVENOUS CONTINUOUS PRN
Status: CANCELLED | OUTPATIENT
Start: 2023-03-07

## 2023-03-07 RX ORDER — OXYTOCIN 10 [USP'U]/ML
10 INJECTION, SOLUTION INTRAMUSCULAR; INTRAVENOUS
Status: CANCELLED | OUTPATIENT
Start: 2023-03-07

## 2023-03-07 RX ORDER — MORPHINE SULFATE 0.5 MG/ML
200 INJECTION, SOLUTION EPIDURAL; INTRATHECAL; INTRAVENOUS ONCE
Status: DISCONTINUED | OUTPATIENT
Start: 2023-03-07 | End: 2023-03-07 | Stop reason: HOSPADM

## 2023-03-07 RX ORDER — LABETALOL HYDROCHLORIDE 5 MG/ML
20 INJECTION, SOLUTION INTRAVENOUS
Status: DISCONTINUED | OUTPATIENT
Start: 2023-03-07 | End: 2023-03-11 | Stop reason: HOSPADM

## 2023-03-07 RX ORDER — CARBOPROST TROMETHAMINE 250 UG/ML
250 INJECTION, SOLUTION INTRAMUSCULAR
Status: DISCONTINUED | OUTPATIENT
Start: 2023-03-07 | End: 2023-03-11 | Stop reason: HOSPADM

## 2023-03-07 RX ORDER — METOCLOPRAMIDE 10 MG/1
10 TABLET ORAL EVERY 6 HOURS PRN
Status: DISCONTINUED | OUTPATIENT
Start: 2023-03-07 | End: 2023-03-11 | Stop reason: HOSPADM

## 2023-03-07 RX ORDER — FENTANYL CITRATE 50 UG/ML
INJECTION, SOLUTION INTRAMUSCULAR; INTRAVENOUS PRN
Status: DISCONTINUED | OUTPATIENT
Start: 2023-03-07 | End: 2023-03-07

## 2023-03-07 RX ORDER — MAGNESIUM SULFATE HEPTAHYDRATE 500 MG/ML
10 INJECTION, SOLUTION INTRAMUSCULAR; INTRAVENOUS
Status: DISCONTINUED | OUTPATIENT
Start: 2023-03-07 | End: 2023-03-11 | Stop reason: HOSPADM

## 2023-03-07 RX ORDER — METHYLERGONOVINE MALEATE 0.2 MG/ML
200 INJECTION INTRAVENOUS
Status: DISCONTINUED | OUTPATIENT
Start: 2023-03-07 | End: 2023-03-07 | Stop reason: HOSPADM

## 2023-03-07 RX ORDER — IBUPROFEN 800 MG/1
800 TABLET, FILM COATED ORAL EVERY 6 HOURS
Status: DISCONTINUED | OUTPATIENT
Start: 2023-03-08 | End: 2023-03-11 | Stop reason: HOSPADM

## 2023-03-07 RX ORDER — LIDOCAINE 40 MG/G
CREAM TOPICAL
Status: DISCONTINUED | OUTPATIENT
Start: 2023-03-07 | End: 2023-03-11 | Stop reason: HOSPADM

## 2023-03-07 RX ORDER — MAGNESIUM SULFATE IN WATER 40 MG/ML
2 INJECTION, SOLUTION INTRAVENOUS CONTINUOUS
Status: DISCONTINUED | OUTPATIENT
Start: 2023-03-07 | End: 2023-03-11 | Stop reason: HOSPADM

## 2023-03-07 RX ORDER — CITRIC ACID/SODIUM CITRATE 334-500MG
30 SOLUTION, ORAL ORAL
Status: COMPLETED | OUTPATIENT
Start: 2023-03-07 | End: 2023-03-07

## 2023-03-07 RX ORDER — ONDANSETRON 2 MG/ML
4 INJECTION INTRAMUSCULAR; INTRAVENOUS EVERY 6 HOURS PRN
Status: DISCONTINUED | OUTPATIENT
Start: 2023-03-07 | End: 2023-03-11 | Stop reason: HOSPADM

## 2023-03-07 RX ORDER — KETOROLAC TROMETHAMINE 30 MG/ML
30 INJECTION, SOLUTION INTRAMUSCULAR; INTRAVENOUS EVERY 6 HOURS
Status: COMPLETED | OUTPATIENT
Start: 2023-03-07 | End: 2023-03-08

## 2023-03-07 RX ORDER — CEFAZOLIN SODIUM/WATER 2 G/20 ML
2 SYRINGE (ML) INTRAVENOUS SEE ADMIN INSTRUCTIONS
Status: DISCONTINUED | OUTPATIENT
Start: 2023-03-07 | End: 2023-03-07 | Stop reason: HOSPADM

## 2023-03-07 RX ORDER — MISOPROSTOL 200 UG/1
800 TABLET ORAL
Status: DISCONTINUED | OUTPATIENT
Start: 2023-03-07 | End: 2023-03-07 | Stop reason: HOSPADM

## 2023-03-07 RX ORDER — MAGNESIUM SULFATE HEPTAHYDRATE 40 MG/ML
2 INJECTION, SOLUTION INTRAVENOUS
Status: DISCONTINUED | OUTPATIENT
Start: 2023-03-07 | End: 2023-03-11 | Stop reason: HOSPADM

## 2023-03-07 RX ORDER — FENTANYL CITRATE 50 UG/ML
25 INJECTION, SOLUTION INTRAMUSCULAR; INTRAVENOUS EVERY 5 MIN PRN
Status: CANCELLED | OUTPATIENT
Start: 2023-03-07

## 2023-03-07 RX ORDER — OXYTOCIN 10 [USP'U]/ML
10 INJECTION, SOLUTION INTRAMUSCULAR; INTRAVENOUS
Status: DISCONTINUED | OUTPATIENT
Start: 2023-03-07 | End: 2023-03-07 | Stop reason: HOSPADM

## 2023-03-07 RX ORDER — ONDANSETRON 2 MG/ML
4 INJECTION INTRAMUSCULAR; INTRAVENOUS EVERY 30 MIN PRN
Status: CANCELLED | OUTPATIENT
Start: 2023-03-07

## 2023-03-07 RX ORDER — FUROSEMIDE 10 MG/ML
10 INJECTION INTRAMUSCULAR; INTRAVENOUS ONCE
Status: COMPLETED | OUTPATIENT
Start: 2023-03-07 | End: 2023-03-07

## 2023-03-07 RX ORDER — DEXTROSE, SODIUM CHLORIDE, SODIUM LACTATE, POTASSIUM CHLORIDE, AND CALCIUM CHLORIDE 5; .6; .31; .03; .02 G/100ML; G/100ML; G/100ML; G/100ML; G/100ML
INJECTION, SOLUTION INTRAVENOUS CONTINUOUS
Status: DISCONTINUED | OUTPATIENT
Start: 2023-03-07 | End: 2023-03-11 | Stop reason: HOSPADM

## 2023-03-07 RX ORDER — OXYCODONE HYDROCHLORIDE 5 MG/1
5 TABLET ORAL EVERY 4 HOURS PRN
Status: DISCONTINUED | OUTPATIENT
Start: 2023-03-07 | End: 2023-03-11 | Stop reason: HOSPADM

## 2023-03-07 RX ORDER — CITRIC ACID/SODIUM CITRATE 334-500MG
30 SOLUTION, ORAL ORAL
Status: DISCONTINUED | OUTPATIENT
Start: 2023-03-07 | End: 2023-03-07 | Stop reason: HOSPADM

## 2023-03-07 RX ORDER — SODIUM CHLORIDE, SODIUM LACTATE, POTASSIUM CHLORIDE, CALCIUM CHLORIDE 600; 310; 30; 20 MG/100ML; MG/100ML; MG/100ML; MG/100ML
INJECTION, SOLUTION INTRAVENOUS CONTINUOUS
Status: CANCELLED | OUTPATIENT
Start: 2023-03-07

## 2023-03-07 RX ORDER — BUPIVACAINE HYDROCHLORIDE 7.5 MG/ML
INJECTION, SOLUTION INTRASPINAL PRN
Status: DISCONTINUED | OUTPATIENT
Start: 2023-03-07 | End: 2023-03-07

## 2023-03-07 RX ORDER — ACETAMINOPHEN 325 MG/1
975 TABLET ORAL EVERY 6 HOURS
Status: DISCONTINUED | OUTPATIENT
Start: 2023-03-07 | End: 2023-03-07

## 2023-03-07 RX ORDER — MODIFIED LANOLIN
OINTMENT (GRAM) TOPICAL
Status: DISCONTINUED | OUTPATIENT
Start: 2023-03-07 | End: 2023-03-11 | Stop reason: HOSPADM

## 2023-03-07 RX ORDER — MISOPROSTOL 200 UG/1
400 TABLET ORAL
Status: DISCONTINUED | OUTPATIENT
Start: 2023-03-07 | End: 2023-03-11 | Stop reason: HOSPADM

## 2023-03-07 RX ORDER — SIMETHICONE 80 MG
80 TABLET,CHEWABLE ORAL 4 TIMES DAILY PRN
Status: DISCONTINUED | OUTPATIENT
Start: 2023-03-07 | End: 2023-03-11 | Stop reason: HOSPADM

## 2023-03-07 RX ADMIN — FENTANYL CITRATE 25 MCG: 50 INJECTION, SOLUTION INTRAMUSCULAR; INTRAVENOUS at 02:04

## 2023-03-07 RX ADMIN — ACETAMINOPHEN 975 MG: 325 TABLET ORAL at 10:40

## 2023-03-07 RX ADMIN — ENOXAPARIN SODIUM 40 MG: 40 INJECTION SUBCUTANEOUS at 17:32

## 2023-03-07 RX ADMIN — TRANEXAMIC ACID 1 G: 1 INJECTION, SOLUTION INTRAVENOUS at 02:03

## 2023-03-07 RX ADMIN — KETOROLAC TROMETHAMINE 30 MG: 30 INJECTION, SOLUTION INTRAMUSCULAR; INTRAVENOUS at 17:26

## 2023-03-07 RX ADMIN — KETOROLAC TROMETHAMINE 30 MG: 30 INJECTION, SOLUTION INTRAMUSCULAR; INTRAVENOUS at 12:05

## 2023-03-07 RX ADMIN — NIFEDIPINE 30 MG: 30 TABLET, EXTENDED RELEASE ORAL at 07:08

## 2023-03-07 RX ADMIN — BUPIVACAINE HYDROCHLORIDE IN DEXTROSE 1.7 ML: 7.5 INJECTION, SOLUTION SUBARACHNOID at 02:04

## 2023-03-07 RX ADMIN — MORPHINE SULFATE 200 MCG: 0.5 INJECTION, SOLUTION EPIDURAL; INTRATHECAL; INTRAVENOUS at 02:04

## 2023-03-07 RX ADMIN — BUPIVACAINE 20 ML: 13.3 INJECTION, SUSPENSION, LIPOSOMAL INFILTRATION at 02:54

## 2023-03-07 RX ADMIN — PHENYLEPHRINE HYDROCHLORIDE 0.3 MCG/KG/MIN: 10 INJECTION INTRAVENOUS at 02:08

## 2023-03-07 RX ADMIN — ONDANSETRON 4 MG: 2 INJECTION INTRAMUSCULAR; INTRAVENOUS at 02:24

## 2023-03-07 RX ADMIN — Medication 2 G: at 02:02

## 2023-03-07 RX ADMIN — SENNOSIDES AND DOCUSATE SODIUM 1 TABLET: 50; 8.6 TABLET ORAL at 10:40

## 2023-03-07 RX ADMIN — SODIUM CHLORIDE, POTASSIUM CHLORIDE, SODIUM LACTATE AND CALCIUM CHLORIDE 50 ML/HR: 600; 310; 30; 20 INJECTION, SOLUTION INTRAVENOUS at 06:14

## 2023-03-07 RX ADMIN — BUPIVACAINE HYDROCHLORIDE 40 ML: 2.5 INJECTION, SOLUTION EPIDURAL; INFILTRATION; INTRACAUDAL at 02:54

## 2023-03-07 RX ADMIN — NALBUPHINE HYDROCHLORIDE 2.5 MG: 10 INJECTION, SOLUTION INTRAMUSCULAR; INTRAVENOUS; SUBCUTANEOUS at 06:09

## 2023-03-07 RX ADMIN — MAGNESIUM SULFATE HEPTAHYDRATE 2 G/HR: 40 INJECTION, SOLUTION INTRAVENOUS at 16:30

## 2023-03-07 RX ADMIN — KETOROLAC TROMETHAMINE 30 MG: 30 INJECTION, SOLUTION INTRAMUSCULAR; INTRAVENOUS at 06:09

## 2023-03-07 RX ADMIN — FUROSEMIDE 10 MG: 10 INJECTION, SOLUTION INTRAMUSCULAR; INTRAVENOUS at 17:28

## 2023-03-07 RX ADMIN — DIPHENHYDRAMINE HYDROCHLORIDE 12.5 MG: 50 INJECTION, SOLUTION INTRAMUSCULAR; INTRAVENOUS at 10:54

## 2023-03-07 RX ADMIN — NIFEDIPINE 10 MG: 10 CAPSULE ORAL at 04:46

## 2023-03-07 RX ADMIN — SODIUM CHLORIDE, POTASSIUM CHLORIDE, SODIUM LACTATE AND CALCIUM CHLORIDE: 600; 310; 30; 20 INJECTION, SOLUTION INTRAVENOUS at 01:53

## 2023-03-07 RX ADMIN — SODIUM CHLORIDE, POTASSIUM CHLORIDE, SODIUM LACTATE AND CALCIUM CHLORIDE: 600; 310; 30; 20 INJECTION, SOLUTION INTRAVENOUS at 02:22

## 2023-03-07 RX ADMIN — ACETAMINOPHEN 975 MG: 325 TABLET ORAL at 03:44

## 2023-03-07 RX ADMIN — SODIUM CITRATE AND CITRIC ACID MONOHYDRATE 30 ML: 500; 334 SOLUTION ORAL at 01:48

## 2023-03-07 ASSESSMENT — ACTIVITIES OF DAILY LIVING (ADL)
ADLS_ACUITY_SCORE: 18

## 2023-03-07 NOTE — ANESTHESIA PREPROCEDURE EVALUATION
Anesthesia Pre-Procedure Evaluation    Patient: Judi Parisi   MRN: [id number] : 1995        Procedure : Procedure(s):   SECTION          No past medical history on file.   Past Surgical History:   Procedure Laterality Date     NO PAST SURGERIES        No Known Allergies   Social History     Tobacco Use     Smoking status: Every Day     Smokeless tobacco: Not on file   Substance Use Topics     Alcohol use: No      Wt Readings from Last 1 Encounters:   22 97.5 kg (215 lb)        Anesthesia Evaluation            ROS/MED HX  ENT/Pulmonary:  - neg pulmonary ROS   (+) sleep apnea,     Neurologic:  - neg neurologic ROS     Cardiovascular:     (+) --PIH and HELLP, Mg ++ gtt, BP Meds and Mild/mod ---    METS/Exercise Tolerance:     Hematologic:     (+) no thrombocytopenia, anemia,     Musculoskeletal:       GI/Hepatic:  - neg GI/hepatic ROS     Renal/Genitourinary:       Endo:     (+) Obesity,     Psychiatric/Substance Use:  - neg psychiatric ROS     Infectious Disease:       Malignancy:       Other:            Physical Exam    Airway        Mallampati: II   TM distance: > 3 FB   Neck ROM: full     Respiratory Devices and Support         Dental       (+) Completely normal teeth      Cardiovascular   cardiovascular exam normal          Pulmonary   pulmonary exam normal                OUTSIDE LABS:  CBC:   Lab Results   Component Value Date    WBC 17.2 (H) 2023    WBC 12.8 (H) 2023    HGB 14.4 2023    HGB 13.5 2023    HCT 39.9 2023    HCT 38.3 2023     2023     2023     BMP:   Lab Results   Component Value Date     2022    POTASSIUM 3.6 2022    CHLORIDE 109 2022    CO2 22 2022    BUN 8 2022    CR 0.68 2023    CR 0.54 2022    GLC 96 2022     COAGS:   Lab Results   Component Value Date    PTT 24 2023    INR 0.92 2023     POC: No results found for: BGM, HCG, HCGS  HEPATIC:   Lab  Results   Component Value Date     (H) 03/07/2023    AST 93 (H) 03/07/2023     OTHER:   Lab Results   Component Value Date    JANET 8.8 09/01/2022       Anesthesia Plan    ASA Status:  3, emergent       Anesthesia Type: Spinal.              Consents    Anesthesia Plan(s) and associated risks, benefits, and realistic alternatives discussed. Questions answered and patient/representative(s) expressed understanding.    - Discussed:     - Discussed with:  Patient      - Extended Intubation/Ventilatory Support Discussed: No.      - Patient is DNR/DNI Status: No    Use of blood products discussed: Yes.     - Discussed with: Patient.     - Consented: consented to blood products            Reason for refusal: other.     Postoperative Care    Pain management: IV analgesics, Oral pain medications, Peripheral nerve block (Single Shot), Multi-modal analgesia.   PONV prophylaxis: Dexamethasone or Solumedrol, Ondansetron (or other 5HT-3)     Comments:    Other Comments: Decadron, Zofran.  PE infusion.  Duramorph.  TAP blocks for POP per surgeon request.  Toradol, Tylenol.       neg OB ROS.       Deandre Love MD

## 2023-03-07 NOTE — PROGRESS NOTES
Csection - Post operative day 0    ASSESSMENT: PLAN:   POD#0    Primary csection   HELLP- on magnesium  Hypertension- on nifedpine XL 30 mg daily- will continue to monitor  Infant in NICU due to premtaurity  Continue routine cares       SUBJECTIVE:    The patient feels well: Catheter is out, bleeding decreased, tolerating normal diet, and passing flatus.  Pain is well controlled. The patient has no emotional concerns.  The baby is well and being fed    OBJECTIVE:  BP (!) 158/92   Pulse 86   Temp 98.2  F (36.8  C) (Axillary)   Resp 16   SpO2 98%   Breastfeeding Unknown     Fundus firm  Incision- dressing dry   Ext- nontender      Lab  Hemoglobin   Date Value Ref Range Status   03/07/2023 13.7 11.7 - 15.7 g/dL Final   ]            Berenice Adams MD  [facility]  [phone]

## 2023-03-07 NOTE — PROVIDER NOTIFICATION
BP at 0700 166/99 Per previous RN Dr. Wiggins notified and patient started on nifedipine ER 30mg daily, plan to recheck BP at 0730    BP at 0730 156/94, Dr. Arredondo updated. Plan to recheck in 30 min, if BP not severe can recheck BP q2H x1 then move to q4H. Patient in agreement with plan.

## 2023-03-07 NOTE — ANESTHESIA CARE TRANSFER NOTE
Patient: Judi PACHECO Isak    Procedure: Procedure(s):   SECTION       Diagnosis: Fetal intolerance to labor, delivered, current hospitalization [O77.9]  Diagnosis Additional Information: No value filed.    Anesthesia Type:   Spinal     Note:    Oropharynx: oropharynx clear of all foreign objects  Level of Consciousness: awake  Oxygen Supplementation: room air    Independent Airway: airway patency satisfactory and stable  Dentition: dentition unchanged  Vital Signs Stable: post-procedure vital signs reviewed and stable  Report to RN Given: handoff report given  Patient transferred to: Labor and Delivery    Handoff Report: Identifed the Patient, Identified the Reponsible Provider, Reviewed the pertinent medical history, Discussed the surgical course, Reviewed Intra-OP anesthesia mangement and issues during anesthesia, Set expectations for post-procedure period and Allowed opportunity for questions and acknowledgement of understanding      Vitals:  Vitals Value Taken Time   /76 233   Temp     Pulse 86 23   Resp 16 23   SpO2 98 % 23       Electronically Signed By: WHITNEY Johnson CRNA  2023  3:14 AM

## 2023-03-07 NOTE — PROGRESS NOTES
"Pre-Op  Pt ambulated to OR 1, accompaid by LUANNE Garcia.  Pt sat up for spinal. Still had a metal piercing in. Anesthesia waited for pt to remove. Stud given to LUANNE Garcia. This was only \"delay\" in surgery.    Post op  Dr. Wiggins notified of QBL of 965.   Dressing marked with drainage after TAP block placed and before pt left OR  via bed to go to room 3.   SCDs in place. IV infusing. Byrne patent. .Katherine Machado RN    "

## 2023-03-07 NOTE — PROGRESS NOTES
Recurrent late decelerations not responding to fluid flush and position change. Baseline still in 150s and mostly moderate variability. BPs not in severe range. UCs are irregular and barely felt by her. No bleeding.   Recommend C section for nonreassurring tracing remote from delivery. Risks and benefits reviewed. All questions answered. She verbalized understanding. She agrees with plan.

## 2023-03-07 NOTE — PLAN OF CARE
VS stable. Treatable pressure in recovery period; PO nifedipine given per order and VS stable since. Bleeding WDL; dressing with small amount of traceable drainage that has not grown. SO at bedside and supportive. Advancing diet. Pumping x1 this shift. TO NICU to see infant when stable.   Problem: Hypertensive Disorders in Pregnancy  Goal: Maternal-Fetal Stabilization  Outcome: Progressing     Problem: Postpartum ( Delivery)  Goal: Successful Maternal Role Transition  Outcome: Progressing  Goal: Hemostasis  Outcome: Progressing  Goal: Effective Bowel Elimination  Outcome: Progressing  Goal: Fluid and Electrolyte Balance  Outcome: Progressing  Goal: Absence of Infection Signs and Symptoms  Outcome: Progressing  Goal: Anesthesia/Sedation Recovery  Outcome: Progressing  Goal: Optimal Pain Control and Function  Outcome: Progressing  Intervention: Prevent or Manage Pain  Recent Flowsheet Documentation  Taken 3/7/2023 0609 by Radha Carmona RN  Pain Management Interventions: medication (see MAR)  Taken 3/6/2023 2200 by Radha Carmona, RN  Pain Management Interventions: medication (see MAR)  Goal: Nausea and Vomiting Relief  Outcome: Progressing  Goal: Effective Urinary Elimination  Outcome: Progressing  Goal: Effective Oxygenation and Ventilation  Outcome: Progressing

## 2023-03-07 NOTE — PROVIDER NOTIFICATION
Dr. Arredondo updated on blood pressures, labs, moving rooms, and patient request for Nubain due to itching.     RN received verbal orders to discontinue tylenol at this time due to LFTs and give one time dose IV benadryl 12.5mg.  Provider plans to place orders for repeat labs.

## 2023-03-07 NOTE — PROGRESS NOTES
Dr. Arredondo paged. Notified of pt leg edema tightness despite SCd's and diuresing so well today per manzanares. Discuss exact intake and outputs values, and pts excessive po intake.     Orders received and clarified.     Plan of care discussed with pt.   Karen J Schoenberg, RN

## 2023-03-07 NOTE — LACTATION NOTE
Met with Judi and her , David, in her room.  She desires to make milk for her premature infant.  Instructed to pump at least 8 times in 24 hours, as she is able.  Judi is trying to sleep but desires to pump.  David and this writer held the pump flanges to Judi's breasts while she rested.  David instructed on use of pump on initiate program, assembly and cleaning of pump parts.  Judi obtained moisture only when pumping.      Judi has a large amount of fluid in her hands.  At this time, her nipple/ areolar tissue is soft.  This writer educated David if the areolar tissue becomes firm, using reverse pressure softening and hand expression can help to soften the tissues.  This writer demonstrated reverse pressure softening.      RN to make a hands-free pumping bra for Judi at the next pumping session.

## 2023-03-07 NOTE — PROVIDER NOTIFICATION
03/07/23 0427 03/07/23 0441   Vital Signs   BP (!) 197/107 (!) 175/97     Vitals as above. Provider aware; verbal orders to start nifedipine order for BP treatment. 10 mg PO nifedipine given; BP to be rechecked in 20 mins. Patient denies HA, visual changes, or RUQ pain.

## 2023-03-07 NOTE — PROGRESS NOTES
Patient arrives via ambulance to [facility] room 3. Accompanied by SO. Report received from EMTs. Patient reports uncomplicated pregnancy except for a 2 vessel umbilical cord. Patient denies current headache, visual changes, or RUQ pain; although reports vision changes this AM in shower. Patient reports chest tightness. Initial BP within treatable range. Orders placed by Dr. Wiggins with PO procardia as initial line of treatment. Charge nurse at bedside for assistance. NICU consult placed.

## 2023-03-07 NOTE — ED NOTES
Expected Patient Referral to ED  7:37 PM    Referring Clinic/Provider:  [facility]    Reason for referral/Clinical facts:  34 weeks pregnant. One day of peripheral swelling, chest pressure and shortness of breath, is hypertensive. Concern for pre-eclampsia.      Recommendations provided:  Send to ED for further evaluation    Caller was informed that this institution does possess the capabilities and/or resources to provide for patient and should be transferred to our facility.    Discussed that if direct admit is sought and any hurdles are encountered, this ED would be happy to see the patient and evaluate.    Informed caller that recommendations provided are recommendations based only on the facts provided and that they responsible to accept or reject the advice, or to seek a formal in person consultation as needed and that this ED will see/treat patient should they arrive.      EDWIN ETIENNE MD  [facility] EMERGENCY DEPARTMENT  [address] [place] [id number]  [phone]       Edwin Etienne MD  03/06/23 8829

## 2023-03-07 NOTE — OP NOTE
NAME:  Judi Parisi     DATE OF SERVICE: 3/7/2023     PREOPERATIVE DIAGNOSIS: 34 weeks, severe preeclampsia with HELLP syndrome, nonreassuring fetal heart tracing remote from delivery    POSTOPERATIVE DIAGNOSIS: Same    PROCEDURE: Primary Low transverse  section      SURGEON:  Manuel Wiggins MD     ASSISTANT: Staff    ANESTHESIA: spinal    QBL Delivery QBL (mL): 965cc    DRAINS: Byrne catheter.    COMPLICATIONS: None    FINDINGS: Normal uterus, tubes and ovaries bilateral. Normal appearance to the adnexae.  Live female infant born with Apgars of 8 at one minute, 9 at 5 minutes,  weight unavailable at time of dictation.    PROCEDURE:  Patient was met preoperatively where we discussed the procedure and the risks associated with the procedure.  She understood these to include but not limited to injury to adjacent organs including bowel, bladder, ureter, infection and bleeding. Understanding these risks her consents were signed.      She was brought to the operating room in stable condition.  After induction of a spinal anesthetic, fetal heart tones were checked and were stable. She was carefully prepped and draped in sterile fashion for the procedure.  A timeout was then performed.      A Pfannenstiel skin incision was made and carried down to the rectus fascia which was incised in the midline and carried out bilaterally.  The superior and inferior aspects of the rectus fascia were elevated up and the underlying rectus muscles dissected off with sharp and blunt techniques.  The rectus muscles were now  at the midline and the peritoneum was identified and entered sharply.  This incision was then extended.  A bladder blade was introduced. The vesicouterine peritoneum was identified and a bladder flap was created.  A low transverse uterine incision was made revealing clear amniotic fluid.  The baby's head was then delivered.The body and shoulders were delivered without complication. The cord was  clamped x 2 and cut and the infant handed off to waiting nursing personnel.    The placenta was then manually removed from the uterus.  The uterus was exteriorized, covered with a moist laparotomy sponge and cleared of all clots and debris.  The uterine incision was now closed with 0 Vicryl from both angles in a running locking suture.  2 extra sutures were required at the left edge of the incision for adequate hemostasis.  I imbricated the incision with a layer of 0 Monocryl. The bladder flap was left to heal by secondary intention. The uterus was returned to the abdominopelvic cavity.  The pericolic gutters were cleared of all clots and debris.  The uterus was again inspected and noted to be hemostatic. The fascia was brought together with looped O PDS. The subcutaneous tissues were irrigated, made hemostatic with use of electrocautery and 3-0 chromic.  Skin was closed with 4-0 Monocryl.  Patient tolerated this procedure well.  Sponge, lap and needle counts were correct x two.      Manuel Wiggins MD     CC:

## 2023-03-07 NOTE — H&P
[facility] Labor and Delivery History and Physical    Judi Parisi MRN# [id number]   Age: 27 year old YOB: 1995     Date of Admission:  3/6/2023    Primary care provider: No Ref-Primary, Physician           Chief Complaint:   Judi Parisi is a 27 year old female who is 34w1d pregnant and being admitted for Pre-eclampsia.  She had been feeling well until today when she began having a headache this morning and then some chest heaviness later in the day.  The headache is resolved.  She denies any vaginal bleeding or leakage of fluid or contractions.  She endorses good fetal movement.  She went to urgent care and her blood pressure was 200/100 and so she was sent immediately to labor and delivery.    Prenatal care has been with [facility] and is significant for group B strep positive and a single umbilical artery.  Otherwise there were no abnormalities including diabetes testing and genetic testing.          Pregnancy history:     OBSTETRIC HISTORY:    OB History    Para Term  AB Living   1 0 0 0 0 0   SAB IAB Ectopic Multiple Live Births   0 0 0 0 0      # Outcome Date GA Lbr Rick/2nd Weight Sex Delivery Anes PTL Lv   1 Current                EDC: Estimated Date of Delivery: 23    Prenatal Labs:   Lab Results   Component Value Date    AS Negative 2023    HEPBANG Nonreactive 2022    HGB 13.5 2023       GBS Status:   Group B strep positive    Active Problem List  Patient Active Problem List   Diagnosis     Severe preeclampsia       Medication Prior to Admission  Medications Prior to Admission   Medication Sig Dispense Refill Last Dose     fluticasone (FLONASE) 50 mcg/actuation nasal spray [FLUTICASONE (FLONASE) 50 MCG/ACTUATION NASAL SPRAY] 1 spray into each nostril daily. 16 g 0      HYDROcodone-acetaminophen 5-325 mg per tablet [HYDROCODONE-ACETAMINOPHEN 5-325 MG PER TABLET] Take 1 tablet by mouth every 4 (four) hours as needed for pain. 10 tablet 0     .        Maternal Past Medical History:   No past medical history on file.                    Family History:   This patient has no significant family history            Social History:   This patient has no significant social history         Review of Systems:   CONSTITUTIONAL: NEGATIVE for fever, chills, change in weight  ENT/MOUTH: NEGATIVE for ear, mouth and throat problems  RESP: NEGATIVE for significant cough or SOB  CV: NEGATIVE for chest pain, palpitations or peripheral edema          Physical Exam:   Vitals were reviewed                     Lungs:   No increased work of breathing, good air exchange, clear to auscultation bilaterally, no crackles or wheezing     Cardiovascular:   regular rate and rhythm     Abdomen:   No scars, normal bowel sounds, soft, non-distended, non-tender, no masses palpated, no hepatosplenomegally     Neurologic:   Deep Tendon Reflexes:  Right Knee:  1+  Left Knee:  1+      Cervix:   Membranes: intact   Dilation: closed   Effacement: 20%   Station:-2   Consistency: firm   Position: Mid  Presentation:Vertex by ultrasound  Fetal Heart Rate Tracing: Category 1 tracing  Tocometer: No contractions    Labs: Platelet 177  AST 86    Creatinine 0.68    Creatinine ratio 5                       Assessment:   Judi Parisi is a 34w1d pregnant female admitted with severe preeclampsia and help syndrome.          Plan:   She was given several doses of oral nifedipine which brought her pressures down from the 200/100 down to 150s over 80s.  Magnesium sulfate was started for seizure prophylaxis.  She was given betamethasone for fetal lung maturity.  My recommendation is that we start heading towards delivery with Cytotec.  I did discuss the risks of prematurity versus the risks of the sequelae of preeclampsia.  All questions were answered.  She verbalized understanding the above.  She agrees with the plan.    We will repeat the labs in 6 hours.  We moved towards  section if blood  pressure is uncontrollable, help syndrome significantly worsens, nonreassuring fetal tracing.  Otherwise we will continue attempting induction of labor.    Manuel Wiggins MD

## 2023-03-07 NOTE — PROGRESS NOTES
Lasix given.   Pt instructed in further plans of care and limiting po intake to 4000 ml.day  Karen J Schoenberg, RN

## 2023-03-07 NOTE — ANESTHESIA PROCEDURE NOTES
"Intrathecal injection Procedure Note    Pre-Procedure   Staff -        Anesthesiologist:  Deandre Love MD       Performed By: anesthesiologist       Location: OB       Procedure Start/Stop Times: 3/7/2023 2:00 AM and 3/7/2023 2:04 AM       Pre-Anesthestic Checklist: patient identified, IV checked, risks and benefits discussed, informed consent, monitors and equipment checked and pre-op evaluation  Timeout:       Correct Patient: Yes        Correct Procedure: Yes        Correct Site: Yes        Correct Position: Yes   Procedure Documentation  Procedure: intrathecal injection       Patient Position: sitting       Patient Prep/Sterile Barriers: sterile gloves, mask, patient draped       Skin prep: Chloraprep       Insertion Site: L3-4. (midline approach).       Needle Gauge: 25.        Needle Length (Inches): 3.5        Spinal Needle Type: Pencan       Introducer used       Introducer: 20 G       # of attempts: 1 and  # of redirects:  0    Assessment/Narrative         Paresthesias: No.       CSF fluid: clear.    Medication(s) Administered   Medication Administration Time: 3/7/2023 2:00 AM      FOR [facility] ([facility]) ONLY:   Pain Team Contact information: please page the Pain Team Via [employer]. Search \"Pain\". During daytime hours, please page the attending first. At night please page the resident first.    "

## 2023-03-08 LAB
ALT SERPL W P-5'-P-CCNC: 95 U/L (ref 10–35)
AST SERPL W P-5'-P-CCNC: 54 U/L (ref 10–35)
ERYTHROCYTE [DISTWIDTH] IN BLOOD BY AUTOMATED COUNT: 13.9 % (ref 10–15)
HCT VFR BLD AUTO: 31.6 % (ref 35–47)
HGB BLD-MCNC: 11 G/DL (ref 11.7–15.7)
MCH RBC QN AUTO: 32.3 PG (ref 26.5–33)
MCHC RBC AUTO-ENTMCNC: 34.8 G/DL (ref 31.5–36.5)
MCV RBC AUTO: 93 FL (ref 78–100)
PLATELET # BLD AUTO: 143 10E3/UL (ref 150–450)
RBC # BLD AUTO: 3.41 10E6/UL (ref 3.8–5.2)
WBC # BLD AUTO: 13.5 10E3/UL (ref 4–11)

## 2023-03-08 PROCEDURE — 250N000013 HC RX MED GY IP 250 OP 250 PS 637: Performed by: OBSTETRICS & GYNECOLOGY

## 2023-03-08 PROCEDURE — 36415 COLL VENOUS BLD VENIPUNCTURE: CPT | Performed by: OBSTETRICS & GYNECOLOGY

## 2023-03-08 PROCEDURE — 84450 TRANSFERASE (AST) (SGOT): CPT | Performed by: OBSTETRICS & GYNECOLOGY

## 2023-03-08 PROCEDURE — 250N000011 HC RX IP 250 OP 636: Performed by: OBSTETRICS & GYNECOLOGY

## 2023-03-08 PROCEDURE — 85027 COMPLETE CBC AUTOMATED: CPT | Performed by: OBSTETRICS & GYNECOLOGY

## 2023-03-08 PROCEDURE — 120N000001 HC R&B MED SURG/OB

## 2023-03-08 PROCEDURE — 84460 ALANINE AMINO (ALT) (SGPT): CPT | Performed by: OBSTETRICS & GYNECOLOGY

## 2023-03-08 RX ORDER — LABETALOL 200 MG/1
200 TABLET, FILM COATED ORAL 2 TIMES DAILY
Status: DISCONTINUED | OUTPATIENT
Start: 2023-03-08 | End: 2023-03-11 | Stop reason: HOSPADM

## 2023-03-08 RX ADMIN — OXYCODONE HYDROCHLORIDE 5 MG: 5 TABLET ORAL at 23:15

## 2023-03-08 RX ADMIN — ENOXAPARIN SODIUM 40 MG: 40 INJECTION SUBCUTANEOUS at 05:55

## 2023-03-08 RX ADMIN — KETOROLAC TROMETHAMINE 30 MG: 30 INJECTION, SOLUTION INTRAMUSCULAR; INTRAVENOUS at 00:16

## 2023-03-08 RX ADMIN — LABETALOL HYDROCHLORIDE 200 MG: 200 TABLET ORAL at 21:25

## 2023-03-08 RX ADMIN — OXYCODONE HYDROCHLORIDE 5 MG: 5 TABLET ORAL at 11:56

## 2023-03-08 RX ADMIN — SENNOSIDES AND DOCUSATE SODIUM 1 TABLET: 50; 8.6 TABLET ORAL at 09:04

## 2023-03-08 RX ADMIN — LABETALOL HYDROCHLORIDE 200 MG: 200 TABLET ORAL at 14:27

## 2023-03-08 RX ADMIN — OXYCODONE HYDROCHLORIDE 5 MG: 5 TABLET ORAL at 18:53

## 2023-03-08 RX ADMIN — NIFEDIPINE 30 MG: 30 TABLET, EXTENDED RELEASE ORAL at 09:05

## 2023-03-08 RX ADMIN — SENNOSIDES AND DOCUSATE SODIUM 2 TABLET: 50; 8.6 TABLET ORAL at 21:23

## 2023-03-08 RX ADMIN — IBUPROFEN 800 MG: 800 TABLET ORAL at 11:56

## 2023-03-08 RX ADMIN — IBUPROFEN 800 MG: 800 TABLET ORAL at 05:54

## 2023-03-08 RX ADMIN — ENOXAPARIN SODIUM 40 MG: 40 INJECTION SUBCUTANEOUS at 18:28

## 2023-03-08 RX ADMIN — IBUPROFEN 800 MG: 800 TABLET ORAL at 18:28

## 2023-03-08 ASSESSMENT — ACTIVITIES OF DAILY LIVING (ADL)
ADLS_ACUITY_SCORE: 19
ADLS_ACUITY_SCORE: 19
ADLS_ACUITY_SCORE: 18
ADLS_ACUITY_SCORE: 18
ADLS_ACUITY_SCORE: 19
ADLS_ACUITY_SCORE: 18
ADLS_ACUITY_SCORE: 18
ADLS_ACUITY_SCORE: 19
ADLS_ACUITY_SCORE: 19
ADLS_ACUITY_SCORE: 18
ADLS_ACUITY_SCORE: 18
ADLS_ACUITY_SCORE: 19

## 2023-03-08 NOTE — LACTATION NOTE
This note was copied from a baby's chart.  Lactation consultant to patient room to review education on importance of pumping frequently to stimulate milk production. Instructed mom to set alarm on her phone in order to make sure she is pumping 8x in 24 hours. Reviewed use of initiate program setting in these early days. Anticipatory guidance given on mom not seeing much milk the first week due to her pre-clampsia and  delivery. Mom has spectra pump at home, but will rent hospital grade pump on day of discharge.    Gave encouragement and support.

## 2023-03-08 NOTE — PLAN OF CARE
VSS; Bps below treatable range overnight. reflexes normal to brisk without clonus. Patient denies HA, visual changes or RUQ pain. Magnesium infusion stopped 24 hours post delivery. Byrne catheter remains in; to be removed this AM. SO at bedside and supportive. Encouraged frequent breast pumping. Discharge to home when medically stable.   Problem: Plan of Care - These are the overarching goals to be used throughout the patient stay.    Goal: Absence of Hospital-Acquired Illness or Injury  Intervention: Prevent and Manage VTE (Venous Thromboembolism) Risk  Recent Flowsheet Documentation  Taken 3/8/2023 0019 by Radha Carmona RN  VTE Prevention/Management: SCDs (sequential compression devices) on     Problem: Plan of Care - These are the overarching goals to be used throughout the patient stay.    Goal: Optimal Comfort and Wellbeing  Outcome: Progressing  Intervention: Monitor Pain and Promote Comfort  Recent Flowsheet Documentation  Taken 3/8/2023 0016 by Radha Carmona RN  Pain Management Interventions:   medication (see MAR)   cold applied       Problem: Postpartum ( Delivery)  Goal: Successful Maternal Role Transition  Outcome: Progressing     Problem: Postpartum ( Delivery)  Goal: Hemostasis  Outcome: Progressing     Problem: Postpartum ( Delivery)  Goal: Fluid and Electrolyte Balance  Outcome: Progressing     Problem: Postpartum ( Delivery)  Goal: Absence of Infection Signs and Symptoms  Outcome: Progressing     Problem: Postpartum ( Delivery)  Goal: Optimal Pain Control and Function  Outcome: Progressing  Intervention: Prevent or Manage Pain  Recent Flowsheet Documentation  Taken 3/8/2023 0016 by Radha Carmona RN  Pain Management Interventions:   medication (see MAR)   cold applied     Problem: Postpartum ( Delivery)  Goal: Effective Urinary Elimination  Outcome: Progressing

## 2023-03-08 NOTE — PROGRESS NOTES
Pt up to w/c to go feed infant at NICU. Encouraged to keep visit short, call RN prison prn.     Byrne cath patent and draining pale yellow urine in moderate amounts.     Karen J Schoenberg, RN

## 2023-03-08 NOTE — CONSULTS
NOTE COPIED FROM 'S CHART:    INITIAL SOCIAL WORK NICU ASSESSMENT      DATA:    SW met with pts mom, Judi, in her postpartum room. When SW entered the room, Judi's dad Tavo was present. He stepped out of the room for SW assessment.     Reason for Social Work Consult: NICU admission     Living Situation: Judi reports that she and FOB, David, live together. The pt is their first baby. Judi provides their address in [facility] ([address] [place] [address], [place] [address]) as they recently moved. HANY updated the address in [employer].     Social Support: Judi reports having a very strong social support system. She reports that both her family and David' family live in the [facility] area. She reports that all but one of the grandparents are retired and available to assist as needed.     Employment: Judi reports that she and David both work full-time. She reports that she will be taking a 12 week FMLA leave. She reports that David gets one month of paid paternity leave. She reports that he is taking this week off and then will likely save the month of leave until the pt is able to come home.     Insurance: [employer] [place]- Judi reports that David is planning to add the pt to his employer insurance. HANY discussed with Judi that they could also likely have the baby on MA since Judi qualifies. Discussed that it could be a secondary plan to the MA. Judi reports understanding.      Source of Financial Support: Employment. Judi denies being on other county programs though reports that she understood she might qualify for WIC. She reports she hasn't looked into it but would be interested. SW encouraged her to apply and discussed that she likely would qualify since they qualify for MA.       Judi reports that they are still getting baby supplies as this early delivery was unplanned. She reports that they have a baby shower coming up in two weeks and family will assist with getting any other supplies that might be needed after that.     Mental Health  History: Judi denies any history of mental health concerns. She reports that she felt well from a mental health standpoint during her pregnancy.     History of Postpartum Mood Disorders: NA     Chemical Health History: Chart reviewed and no tox screen sent on the pt or Judi.     INTERVENTION:        HANY completed chart review and collaborated with the multidisciplinary team.     Psychosocial Assessment     Introduction to NICU  role and scope of practice     Discussed NICU experience and gave NICU welcome card    Reviewed Hospital and Community Resources     Assessed Chemical Health History and Current Symptoms     Assessed Mental Health History and Current Symptoms     Identified stressors, barriers and family concerns     Provided support and active empathetic listening and validation.     Provided psychoeducation on  mood and anxiety disorders, assessed for any current symptoms or history     ASSESSMENT:      Coping: Judi appears to be coping well with pts NICU admission. She reports that her preeclampsia and pts early delivery were completely unexpected. She became somewhat tearful noting that she had an MD appointment late last week and there was no indication that anything was wrong. She reports that she was upset and felt that the emergency nature of her health and the pts delivery could have been avoided with a different approach at that appointment. HANY provided supportive listening and validated how she is feeling as she processes what has happened. Judi reports that they were not planning to have the pt at [facility] but she is very grateful that she delivered here. She reports that since she sought care on Monday until now she has been well cared for and did not feel that anything about the emergent birth was traumatic as people have been calm and provided information and answered questions as things have proceeded. She notes understanding that pts admission could be anywhere from two-six  weeks depending on progression and that she continues to process this but is grateful the pt is getting the care she needs. SW provided support ane encouraged Judi to give herself time to process. She was receptive to this support. She reports that family would be able to transport her back for visits after her discharge if needed.     Affect: Calm, appropriate, briefly tearful     Mood: Congruent with affect     Motivation/Ability to Access Services: Judi appears able to access services as needed though requested some help getting on WIC as noted above. Discussed process to get on WIC- [facility]. SW provided parent resources and noted the [facility] phone number and the phone number for financial counseling if they have issues with getting insurance/MA in place for the pt. SW also provided [place] broch[place]. Judi reports understanding of these resources. SW will continue to follow and assess for needs throughout NICU admission.     Assessment of Support System:  Strong     Level of engagement with SW: High     Family's understanding of baby's medical situation:  Strong     Family and parent/infant interactions: Not assessed at this time     Assessment of parental risk for PMAD: Minimal- increased due to unexpected premature delivery and NICU admission. This increased risk was discussed with Judi. SW provided brief education on postpartum mood disorders including when to seek help. Judi reports understanding.     Strengths: strong family support, openly processing her experience, stable employment     Vulnerabilities:  first time parents, unexpected premature delivery, NICU admission     Identified Barriers: None at this time     PLAN:    SW provided Judi with welcome information about SW in the NICU. Discussed plan for SW to check in periodically throughout NICU admission. Judi reports understanding and denies any specific SW needs or questions at this time.     ISAI Mathias on 3/8/2023 at 12:12  PM

## 2023-03-08 NOTE — PLAN OF CARE
VSS except /85, denies HA, epigastric pain, and visual distubances, +2 DTR, no clonus, patient on daily procardia. Up ad debbie, patient ambulated to the bathroom without difficulty. Pumping independently. Light amount of lochia, no clots noted. Incision covered with small amount of drainage, marked and unchanged. Ibuprofen for pain control. BS audible/active x4, tolerating PO, denies N/V. Voiding. Visiting infant in NICU, speaking positively of infant. Significant other at bedside and supportive. Continue with plan of care.    BPs consistently 150s/80s-90s, patient asymptomatic. MD notified of elevated BPs. PO Labetalol 200 mg BID added to BP regimen.

## 2023-03-08 NOTE — PROGRESS NOTES
"Csection - Post operative day 3     ASSESSMENT: PLAN:   POD#3     Primary csection   HELLP- s/p 24 hours postpartum  magnesium  Hypertension- still elevated- will increase to  nifedpine XL 90 mg daily- will continue to monitor  Infant in NICU due to premtaurity  Clinically improving   Continue routine cares         SUBJECTIVE:    The patient feels well: Catheter is out, bleeding decreased, tolerating normal diet, and passing flatus.  Pain is well controlled. The patient has no emotional concerns.  The baby is well and being fed     OBJECTIVE:  ...BP (!) 142/90 (BP Location: Left arm, Patient Position: Semi-Pendleton's, Cuff Size: Adult Regular)   Pulse 70   Temp 97.7  F (36.5  C) (Oral)   Resp 18   Ht 1.575 m (5' 2\")   Wt 106.5 kg (234 lb 14.4 oz)   SpO2 97%   Breastfeeding Unknown   BMI 42.96 kg/m      Fundus firm  Incision- dressing dry   Ext- nontender    Berenice Adams MD  [facility]  [phone]      "

## 2023-03-09 LAB
ALT SERPL W P-5'-P-CCNC: 76 U/L (ref 10–35)
AST SERPL W P-5'-P-CCNC: 41 U/L (ref 10–35)
ERYTHROCYTE [DISTWIDTH] IN BLOOD BY AUTOMATED COUNT: 14.2 % (ref 10–15)
HCT VFR BLD AUTO: 30.5 % (ref 35–47)
HGB BLD-MCNC: 10.1 G/DL (ref 11.7–15.7)
MCH RBC QN AUTO: 32 PG (ref 26.5–33)
MCHC RBC AUTO-ENTMCNC: 33.1 G/DL (ref 31.5–36.5)
MCV RBC AUTO: 97 FL (ref 78–100)
PLATELET # BLD AUTO: 121 10E3/UL (ref 150–450)
RBC # BLD AUTO: 3.16 10E6/UL (ref 3.8–5.2)
WBC # BLD AUTO: 11.7 10E3/UL (ref 4–11)

## 2023-03-09 PROCEDURE — 250N000013 HC RX MED GY IP 250 OP 250 PS 637: Performed by: OBSTETRICS & GYNECOLOGY

## 2023-03-09 PROCEDURE — 120N000001 HC R&B MED SURG/OB

## 2023-03-09 PROCEDURE — 84460 ALANINE AMINO (ALT) (SGPT): CPT | Performed by: OBSTETRICS & GYNECOLOGY

## 2023-03-09 PROCEDURE — 250N000011 HC RX IP 250 OP 636: Performed by: OBSTETRICS & GYNECOLOGY

## 2023-03-09 PROCEDURE — 36415 COLL VENOUS BLD VENIPUNCTURE: CPT | Performed by: OBSTETRICS & GYNECOLOGY

## 2023-03-09 PROCEDURE — 85027 COMPLETE CBC AUTOMATED: CPT | Performed by: OBSTETRICS & GYNECOLOGY

## 2023-03-09 PROCEDURE — 84450 TRANSFERASE (AST) (SGOT): CPT | Performed by: OBSTETRICS & GYNECOLOGY

## 2023-03-09 RX ORDER — NIFEDIPINE 30 MG
60 TABLET, EXTENDED RELEASE ORAL DAILY
Status: DISCONTINUED | OUTPATIENT
Start: 2023-03-09 | End: 2023-03-10

## 2023-03-09 RX ADMIN — IBUPROFEN 800 MG: 800 TABLET ORAL at 01:27

## 2023-03-09 RX ADMIN — LABETALOL HYDROCHLORIDE 200 MG: 200 TABLET ORAL at 08:04

## 2023-03-09 RX ADMIN — ENOXAPARIN SODIUM 40 MG: 40 INJECTION SUBCUTANEOUS at 06:41

## 2023-03-09 RX ADMIN — IBUPROFEN 800 MG: 800 TABLET ORAL at 08:04

## 2023-03-09 RX ADMIN — ENOXAPARIN SODIUM 40 MG: 40 INJECTION SUBCUTANEOUS at 18:14

## 2023-03-09 RX ADMIN — NIFEDIPINE 60 MG: 30 TABLET, EXTENDED RELEASE ORAL at 08:04

## 2023-03-09 RX ADMIN — OXYCODONE HYDROCHLORIDE 5 MG: 5 TABLET ORAL at 05:25

## 2023-03-09 RX ADMIN — LABETALOL HYDROCHLORIDE 200 MG: 200 TABLET ORAL at 21:17

## 2023-03-09 RX ADMIN — OXYCODONE HYDROCHLORIDE 5 MG: 5 TABLET ORAL at 18:14

## 2023-03-09 RX ADMIN — IBUPROFEN 800 MG: 800 TABLET ORAL at 14:04

## 2023-03-09 RX ADMIN — SENNOSIDES AND DOCUSATE SODIUM 2 TABLET: 50; 8.6 TABLET ORAL at 08:04

## 2023-03-09 RX ADMIN — SENNOSIDES AND DOCUSATE SODIUM 1 TABLET: 50; 8.6 TABLET ORAL at 21:17

## 2023-03-09 ASSESSMENT — ACTIVITIES OF DAILY LIVING (ADL)
ADLS_ACUITY_SCORE: 18

## 2023-03-09 NOTE — PROVIDER NOTIFICATION
Called Dr. Davila of patient's BP of 152/83 and 150/84. Patient is being treated with nifedipine and labetalol. MD stated if BP systolic is greater than 160 or diastolic is greater than 110, then notify MD. Will continue to monitor patient's blood pressure.

## 2023-03-09 NOTE — PROGRESS NOTES
POD 2-C section with HELLP syndrome  Feeling better. No HA, visual changes, RUQ pain. Voiding and passing flatus.   BP 140s-150s/80-90   overnight  Abd soft  Inc CDI  Plan: Will increase Nifedipine to 60/day. Will repeat Labs

## 2023-03-09 NOTE — PLAN OF CARE
Judi's vitals and maternal assessments WDL. Blood pressures overnight were 137/86 and 148/78. Pain adequately controlled with Ibuprofen and PRN oxycodone. Incision clean, dry and intact with no drainage and open to air. Patient pumping every 3 hours and is tracking the board in the room. Patient visited infant in NICU at the start of the shift.    This RN showed techniques to hand express as well as help with pumping. Patient interested in seeing lactation today to go over further questions about pumping and hand expression.    Problem: Plan of Care - These are the overarching goals to be used throughout the patient stay.    Goal: Optimal Comfort and Wellbeing  Outcome: Progressing  Intervention: Monitor Pain and Promote Comfort  Recent Flowsheet Documentation  Taken 3/9/2023 0127 by Xochitl Maya, RN  Pain Management Interventions: medication (see MAR)  Intervention: Provide Person-Centered Care  Recent Flowsheet Documentation  Taken 3/9/2023 0130 by Xochitl Maya, RN  Trust Relationship/Rapport:    care explained    choices provided    empathic listening provided    questions answered    questions encouraged     Problem: Postpartum ( Delivery)  Goal: Successful Maternal Role Transition  Outcome: Progressing     Problem: Postpartum ( Delivery)  Goal: Optimal Pain Control and Function  Outcome: Progressing  Intervention: Prevent or Manage Pain  Recent Flowsheet Documentation  Taken 3/9/2023 0127 by Xochitl Maya, RN  Pain Management Interventions: medication (see MAR)

## 2023-03-09 NOTE — PLAN OF CARE
Problem: Postpartum ( Delivery)  Goal: Hemostasis  Outcome: Progressing  Goal: Absence of Infection Signs and Symptoms  Outcome: Progressing  Goal: Optimal Pain Control and Function  Outcome: Progressing  Intervention: Prevent or Manage Pain  Recent Flowsheet Documentation  Taken 3/8/2023 1853 by Allan Leonard RN  Pain Management Interventions: medication (see MAR)  Goal: Effective Urinary Elimination  Outcome: Progressing  Goal: Effective Oxygenation and Ventilation  Outcome: Progressing  Intervention: Optimize Oxygenation and Ventilation  Recent Flowsheet Documentation  Taken 3/8/2023 1700 by Allan Leonard RN  Head of Bed (HOB) Positioning: HOB at 30-45 degrees     Vitals and temperature stable. Blood pressures slowly trending down. Last two blood pressures were 145/78 and 140/84. Patient denied headache, vision changes, and epigastric pain. Fundus firm at umbilicus. Incision intact, clean, and open to air. Patient reported she had a well rested nap this shift. Patient appeared well rested. Patient's incision pain was 4-6. PRN pain medication given. Reviewed ROP, birth certification, and mood assessment forms with patient. Patient pumping q 2-3 hours.

## 2023-03-09 NOTE — LACTATION NOTE
"This note was copied from a baby's chart.  This writer met with Judi in her room to discuss pumping her breasts for her premature infant, who is in NICU.  Judi has been able to pump 12 ml x 2 this early morning.  She notes her breasts are becoming firm.  Education given on reverse pressure softening and hand expression.  Instructed to use these techniques prior to pumping to soften areolar tissue, prn,as breasts can be drained easier with the breast pump.      This writer educated her on the \"breast lift\".  With Judi laying on her back in bed, bedside RN and this writer lifted Judi's breasts off her chest wall for 3 continuous minutes, allowing the extra fluid in her breasts to drain out of the breast and back into her circulation.  After three minutes, Judi notes her breast tissue is not as firm.  She will have FOB and or RN assist with this technique, prn.      This writer instructed her to pump again, now.  Education given on the Maintain Program on the hospital grade breast pump (HGP).  Taught \"hands-on\" pumping.  Judi able to express 13 ml this session, even though she just pumped 12 ml 1+ hours ago.  She is able to pump at least 8 times in 24 hours.  She will request another lactation visit, prn.  Lactation to rent her a HGP tomorrow prior to discharge.  She does have a Spectra breast pump at home that is unopened.  She was instructed to contact the clinic that gave her the Spectra breast pump to see if she can return the breast pump now, as insurance will not cover the HGP if insurance has already paid for a standard (Spectra) pump.  Judi verbalizes understanding of all education given.  She denies any further questions.          "

## 2023-03-09 NOTE — PLAN OF CARE
Problem: Plan of Care - These are the overarching goals to be used throughout the patient stay.    Goal: Plan of Care Review  Description: The Plan of Care Review/Shift note should be completed every shift.  The Outcome Evaluation is a brief statement about your assessment that the patient is improving, declining, or no change.  This information will be displayed automatically on your shift note.  Outcome: Progressing   Blood pressure continues to run above 140/90. Dr Wiggins aware. New orders to repeat labs, medication dosage adjustments, vs q 4 hours, ok to discontinue I and O per Dr Wiggins.     No headaches, blurred vision or epigastric pain. Edema improving exception of lower extremities as patient up ambulating to NICU. Pt continues to use pneumo boots when resting and instructed to elevate LE as much as possible.     Patient continues to pump and is getting approx 10 ml milk. She visited with lactation and reviewed reverse pressure softening and measures for engorgement. She would like to rent a hospital grade breast pump at discharge.

## 2023-03-10 LAB — PLATELET # BLD AUTO: 135 10E3/UL (ref 150–450)

## 2023-03-10 PROCEDURE — 250N000013 HC RX MED GY IP 250 OP 250 PS 637: Performed by: OBSTETRICS & GYNECOLOGY

## 2023-03-10 PROCEDURE — 250N000011 HC RX IP 250 OP 636: Performed by: OBSTETRICS & GYNECOLOGY

## 2023-03-10 PROCEDURE — 120N000001 HC R&B MED SURG/OB

## 2023-03-10 PROCEDURE — 36415 COLL VENOUS BLD VENIPUNCTURE: CPT | Performed by: OBSTETRICS & GYNECOLOGY

## 2023-03-10 PROCEDURE — 85049 AUTOMATED PLATELET COUNT: CPT | Performed by: OBSTETRICS & GYNECOLOGY

## 2023-03-10 RX ORDER — DIPHENHYDRAMINE HCL 25 MG
50 CAPSULE ORAL EVERY 6 HOURS PRN
Status: DISCONTINUED | OUTPATIENT
Start: 2023-03-10 | End: 2023-03-11 | Stop reason: HOSPADM

## 2023-03-10 RX ORDER — NIFEDIPINE 30 MG
30 TABLET, EXTENDED RELEASE ORAL ONCE
Status: DISCONTINUED | OUTPATIENT
Start: 2023-03-10 | End: 2023-03-10

## 2023-03-10 RX ORDER — NIFEDIPINE 30 MG
90 TABLET, EXTENDED RELEASE ORAL DAILY
Status: DISCONTINUED | OUTPATIENT
Start: 2023-03-11 | End: 2023-03-10

## 2023-03-10 RX ORDER — NIFEDIPINE 30 MG
60 TABLET, EXTENDED RELEASE ORAL DAILY
Status: DISCONTINUED | OUTPATIENT
Start: 2023-03-11 | End: 2023-03-11 | Stop reason: HOSPADM

## 2023-03-10 RX ADMIN — ENOXAPARIN SODIUM 40 MG: 40 INJECTION SUBCUTANEOUS at 05:42

## 2023-03-10 RX ADMIN — IBUPROFEN 800 MG: 800 TABLET ORAL at 16:29

## 2023-03-10 RX ADMIN — IBUPROFEN 800 MG: 800 TABLET ORAL at 02:12

## 2023-03-10 RX ADMIN — OXYCODONE HYDROCHLORIDE 5 MG: 5 TABLET ORAL at 08:21

## 2023-03-10 RX ADMIN — SENNOSIDES AND DOCUSATE SODIUM 1 TABLET: 50; 8.6 TABLET ORAL at 22:14

## 2023-03-10 RX ADMIN — DIPHENHYDRAMINE HYDROCHLORIDE 50 MG: 25 CAPSULE ORAL at 22:24

## 2023-03-10 RX ADMIN — LABETALOL HYDROCHLORIDE 200 MG: 200 TABLET ORAL at 22:14

## 2023-03-10 RX ADMIN — OXYCODONE HYDROCHLORIDE 5 MG: 5 TABLET ORAL at 02:12

## 2023-03-10 RX ADMIN — LABETALOL HYDROCHLORIDE 200 MG: 200 TABLET ORAL at 08:48

## 2023-03-10 RX ADMIN — IBUPROFEN 800 MG: 800 TABLET ORAL at 08:21

## 2023-03-10 RX ADMIN — OXYCODONE HYDROCHLORIDE 5 MG: 5 TABLET ORAL at 22:22

## 2023-03-10 RX ADMIN — IBUPROFEN 800 MG: 800 TABLET ORAL at 22:14

## 2023-03-10 RX ADMIN — SENNOSIDES AND DOCUSATE SODIUM 1 TABLET: 50; 8.6 TABLET ORAL at 08:53

## 2023-03-10 RX ADMIN — ENOXAPARIN SODIUM 40 MG: 40 INJECTION SUBCUTANEOUS at 22:18

## 2023-03-10 RX ADMIN — NIFEDIPINE 60 MG: 30 TABLET, EXTENDED RELEASE ORAL at 08:48

## 2023-03-10 RX ADMIN — OXYCODONE HYDROCHLORIDE 5 MG: 5 TABLET ORAL at 16:29

## 2023-03-10 ASSESSMENT — ACTIVITIES OF DAILY LIVING (ADL)
ADLS_ACUITY_SCORE: 18

## 2023-03-10 NOTE — PLAN OF CARE
Problem: Postpartum ( Delivery)  Goal: Hemostasis  Outcome: Progressing     Problem: Postpartum ( Delivery)  Goal: Effective Urinary Elimination  Outcome: Progressing     Judi's vital signs are stable. Blood pressure is still slightly elevated. Will continue to monitor.  Bleeding is WNL. Voiding without any difficulties. Patient states she feels she is able to completely empty out her bladder. Ambulating independently in room. Denies of any headaches, vision changes, or pain in upper right abdomen. Pain is tolerable with scheduled pain meds and PRN oxycodone. Rash noted on lower abdomen. States slightly itchy but tolerable. Continue to monitor.

## 2023-03-10 NOTE — PROGRESS NOTES
1320: Updated Dr Adams that pt just got back from the NICU, RN checked her bp and it is 125/68. Dr Adams would like the one time dose of 30 mg Nifedipine ER to be canceled. She also would like the 90 mg Nifedipine ER daily order switched back to 60 mg (see new orders). Updated Dr Adams that pt has an abdominal rash and leg/feet/ankle edema +3.

## 2023-03-10 NOTE — PLAN OF CARE
Problem: Plan of Care - These are the overarching goals to be used throughout the patient stay.    Goal: Absence of Hospital-Acquired Illness or Injury  3/9/2023 2212 by Allan Leonard RN  Outcome: Progressing  3/9/2023 2023 by Allan Leonard RN  Outcome: Progressing  Intervention: Prevent Skin Injury  Recent Flowsheet Documentation  Taken 3/9/2023 1735 by Allan Leonard RN  Body Position: position changed independently  Intervention: Prevent and Manage VTE (Venous Thromboembolism) Risk  Recent Flowsheet Documentation  Taken 3/9/2023 1735 by Allan Leonard RN  VTE Prevention/Management: SCDs (sequential compression devices) on  Goal: Optimal Comfort and Wellbeing  3/9/2023 2212 by Allan Leonard RN  Outcome: Progressing  3/9/2023 2023 by Allan Leonard RN  Outcome: Progressing  Intervention: Monitor Pain and Promote Comfort  Recent Flowsheet Documentation  Taken 3/9/2023 2114 by Allan Leonard RN  Pain Management Interventions: declines  Taken 3/9/2023 1735 by Allan Leonard RN  Pain Management Interventions: declines  Taken 3/9/2023 1535 by Allan Leonard RN  Pain Management Interventions: declines  Intervention: Provide Person-Centered Care  Recent Flowsheet Documentation  Taken 3/9/2023 173 by Allan Leonard RN  Trust Relationship/Rapport:   care explained   choices provided   questions answered   questions encouraged     Problem: Postpartum ( Delivery)  Goal: Hemostasis  3/9/2023 2212 by Allan Leonard RN  Outcome: Progressing  3/9/2023 2023 by Allan Leonard RN  Outcome: Progressing  Goal: Effective Bowel Elimination  3/9/2023 2212 by Allan Leonard RN  Outcome: Progressing  3/9/2023 2023 by Allan Leonard RN  Outcome: Progressing  Goal: Absence of Infection Signs and Symptoms  3/9/2023 2212 by Allan Leonard RN  Outcome: Progressing  3/9/2023 2023 by Allan Leonard RN  Outcome: Progressing  Goal: Optimal Pain Control and Function  Outcome: Progressing  Intervention: Prevent or Manage Pain  Recent  Flowsheet Documentation  Taken 3/9/2023 2114 by Allan Leonard, RN  Pain Management Interventions: declines  Taken 3/9/2023 1735 by Allan Leonard RN  Pain Management Interventions: declines  Taken 3/9/2023 1535 by Allan Leonard RN  Pain Management Interventions: declines  Goal: Effective Urinary Elimination  3/9/2023 2212 by Allan Leonard RN  Outcome: Progressing  3/9/2023 2023 by Allan Leonard RN  Outcome: Progressing     Vitals stable, temperature stable. Pt up ad debbie, voiding without difficulty. Passing flatus. Patient stated she had a BM earlier in the day, but not on this shift. Blood pressure has been steady with 142/80 and 144/80. Patient denies headaches, vision changes, and epigastric pain. Incisional pain fluctuated from 2-6/10. Pain medications given. Patient pumping. Patient and spouse visited infant in NICU.

## 2023-03-11 VITALS
OXYGEN SATURATION: 99 % | WEIGHT: 234.9 LBS | TEMPERATURE: 98.6 F | SYSTOLIC BLOOD PRESSURE: 148 MMHG | RESPIRATION RATE: 20 BRPM | BODY MASS INDEX: 43.23 KG/M2 | DIASTOLIC BLOOD PRESSURE: 73 MMHG | HEIGHT: 62 IN | HEART RATE: 97 BPM

## 2023-03-11 PROCEDURE — 250N000013 HC RX MED GY IP 250 OP 250 PS 637: Performed by: OBSTETRICS & GYNECOLOGY

## 2023-03-11 PROCEDURE — 250N000011 HC RX IP 250 OP 636: Performed by: OBSTETRICS & GYNECOLOGY

## 2023-03-11 RX ORDER — LABETALOL 200 MG/1
200 TABLET, FILM COATED ORAL 2 TIMES DAILY
Qty: 60 TABLET | Refills: 1 | Status: SHIPPED | OUTPATIENT
Start: 2023-03-11 | End: 2023-04-10

## 2023-03-11 RX ORDER — OXYCODONE HYDROCHLORIDE 5 MG/1
5 TABLET ORAL EVERY 4 HOURS PRN
Qty: 12 TABLET | Refills: 0 | Status: SHIPPED | OUTPATIENT
Start: 2023-03-11 | End: 2024-04-22

## 2023-03-11 RX ORDER — BENZOCAINE/MENTHOL 6 MG-10 MG
LOZENGE MUCOUS MEMBRANE 2 TIMES DAILY
Qty: 30 G | Refills: 0 | Status: SHIPPED | OUTPATIENT
Start: 2023-03-11 | End: 2024-04-22

## 2023-03-11 RX ORDER — BENZOCAINE/MENTHOL 6 MG-10 MG
LOZENGE MUCOUS MEMBRANE 2 TIMES DAILY
Status: DISCONTINUED | OUTPATIENT
Start: 2023-03-11 | End: 2023-03-11 | Stop reason: HOSPADM

## 2023-03-11 RX ORDER — IBUPROFEN 800 MG/1
800 TABLET, FILM COATED ORAL EVERY 6 HOURS
Qty: 30 TABLET | Refills: 1 | Status: SHIPPED | OUTPATIENT
Start: 2023-03-11 | End: 2024-04-22

## 2023-03-11 RX ADMIN — IBUPROFEN 800 MG: 800 TABLET ORAL at 04:08

## 2023-03-11 RX ADMIN — IBUPROFEN 800 MG: 800 TABLET ORAL at 14:05

## 2023-03-11 RX ADMIN — OXYCODONE HYDROCHLORIDE 5 MG: 5 TABLET ORAL at 14:05

## 2023-03-11 RX ADMIN — ENOXAPARIN SODIUM 40 MG: 40 INJECTION SUBCUTANEOUS at 09:16

## 2023-03-11 RX ADMIN — HYDROCORTISONE: 1 CREAM TOPICAL at 09:43

## 2023-03-11 RX ADMIN — NIFEDIPINE 60 MG: 30 TABLET, EXTENDED RELEASE ORAL at 09:16

## 2023-03-11 RX ADMIN — LABETALOL HYDROCHLORIDE 200 MG: 200 TABLET ORAL at 09:17

## 2023-03-11 RX ADMIN — SENNOSIDES AND DOCUSATE SODIUM 2 TABLET: 50; 8.6 TABLET ORAL at 09:17

## 2023-03-11 ASSESSMENT — ACTIVITIES OF DAILY LIVING (ADL)
ADLS_ACUITY_SCORE: 18

## 2023-03-11 NOTE — PROGRESS NOTES
Rash a bit better  No HA, visual changes abd pain  /84  Abd soft  Inc CDI  Atopic rash  POD 4 from C section with HELLP syndrome-resolving. Bps stable on meds  Rash from drape-on Benadryl  Plan : home on meds   BP check in 1 week.

## 2023-03-11 NOTE — DISCHARGE SUMMARY
[facility]    Discharge Summary  Obstetrics    Date of Admission:  3/6/2023  Date of Discharge:  3/11/2023  Discharging Provider: Manuel Wiggins MD    Discharge Diagnoses   Fetal intolerance to labor, delivered, current hospitalization [O77.9]  Severe preeclampsia with HELLP syndrome  Atopic Dermatitis  Procedure/Surgery Information   Procedure: Procedure(s):   SECTION   Surgeon(s): Surgeon(s) and Role:     * Manuel Wiggins MD - Primary     History of Present Illness   Judi Parisi is a 27 year old female who presented with HELLP syndrome and underwnet a c section for nonreassuring tracing in labor.     Hospital Course   She recovered as anticipated and experienced no post-operative complications.  On discharge, her pain was well controlled. Vaginal bleeding is similar to peak menstrual flow.  Voiding without difficulty.  Ambulating well and tolerating a normal diet.  No fever or significant wound drainage.  Breastfeeding well.  Infant is stable.  She was discharged on post-partum day #4.    Post-partum hemoglobin:   Hemoglobin   Date Value Ref Range Status   2023 10.1 (L) 11.7 - 15.7 g/dL Final       [place] Depression Scale  Thoughts of Harming Self:    Total Score:        Manuel Wiggins MD    Discharge Disposition   Discharged to home   Condition at discharge: Good        Primary Care Physician   Physician No Ref-Primary    Consultations This Hospital Stay   NEONATOLOGY IP CONSULT  CARE MANAGEMENT / SOCIAL WORK IP CONSULT  LACTATION IP CONSULT    Discharge Orders      Activity    Activity as tolerated     Reason for your hospital stay    Maternity care     Follow Up    Follow up with provider in 2 weeks and 6 weeks for post-delivery checks     After your  Section    Check your incision each day for signs of infection: redness, drainage, warmth, or discomfort. Contact your provider if you have any of these signs or heavy vaginal bleeding.      Do NOT lift anything more than the baby or what you can lift with one hand such as a jug of milk. Avoid lifting with 2 hands and putting strain on your incision.     Check with your provider before taking tub baths.     It is OK to take showers, you may clean your incision with Hibiclens or soapy water and pat it dry. If you have white tapes (steri-strips) leave them on until they fall off.     You may start driving a car two weeks after delivery. You need to be off narcotic pain meds, be able to turn around and back-up the car, and slam on the brakes safely.     Gradually increase your physical activity and exercise level according to how comfortable or tired you feel. Walking and stairs are ok.     You should have an appointment with your surgeon in 2 weeks for an incision check     Hypertension in Pregnancy    If you are on medication for high blood pressure or a new medication was started while you were in the hospital continue as directed by your provider.   Follow up in 1 week.   Call if severe headache, visual changes, severe abdominal pain, nausea, vomiting, or chest pain.     Diet    Resume previous diet     Discharge Medications   Current Discharge Medication List      START taking these medications    Details   hydrocortisone (CORTAID) 1 % external cream Apply topically 2 times daily  Qty: 30 g, Refills: 0    Associated Diagnoses: Severe pre-eclampsia in third trimester      ibuprofen (ADVIL/MOTRIN) 800 MG tablet Take 1 tablet (800 mg) by mouth every 6 hours  Qty: 30 tablet, Refills: 1    Associated Diagnoses: Severe pre-eclampsia in third trimester      labetalol (NORMODYNE) 200 MG tablet Take 1 tablet (200 mg) by mouth 2 times daily for 30 days  Qty: 60 tablet, Refills: 1    Associated Diagnoses: Severe pre-eclampsia in third trimester      NIFEdipine ER (ADALAT CC) 60 MG 24 hr tablet Take 1 tablet (60 mg) by mouth daily for 30 days  Qty: 30 tablet, Refills: 1    Associated Diagnoses: Severe  pre-eclampsia in third trimester      oxyCODONE (ROXICODONE) 5 MG tablet Take 1 tablet (5 mg) by mouth every 4 hours as needed for moderate pain (4-6)  Qty: 12 tablet, Refills: 0    Associated Diagnoses: Severe pre-eclampsia in third trimester         CONTINUE these medications which have NOT CHANGED    Details   fluticasone (FLONASE) 50 mcg/actuation nasal spray [FLUTICASONE (FLONASE) 50 MCG/ACTUATION NASAL SPRAY] 1 spray into each nostril daily.  Qty: 16 g, Refills: 0      HYDROcodone-acetaminophen 5-325 mg per tablet [HYDROCODONE-ACETAMINOPHEN 5-325 MG PER TABLET] Take 1 tablet by mouth every 4 (four) hours as needed for pain.  Qty: 10 tablet, Refills: 0           Allergies   No Known Allergies

## 2023-03-11 NOTE — PLAN OF CARE
Problem: Postpartum ( Delivery)  Goal: Successful Maternal Role Transition  Outcome: Progressing  Breast pumping, storing milk and visiting baby  Goal: Hemostasis  Outcome: Progressing  Goal: Absence of Infection Signs and Symptoms  Outcome: Progressing  Goal: Optimal Pain Control and Function  Outcome: Progressing   Intervention: Prevent or Manage Pain  Recent Flowsheet Documentation  Taken 3/10/2023 2320 by Susana Craig, RN  Pain Management Interventions:    emotional support    rest  Taken 3/10/2023 2214 by Susana Craig, RN  Pain Management Interventions: medication (see MAR)   Patient is managing pain with Ibuprofen and Oxycodone.   Postpartum assessment with worsening abdominal rash.  Benadryl ordered    Problem: Hypertension Comorbidity  Goal: Blood Pressure in Desired Range  Outcome: Progressing  Medications in place

## 2023-03-12 NOTE — PLAN OF CARE
Judi's Temp, RR, HR wnl.  Her blood pressures continue to be high.  She is asymptomatic of preeclampsia.  She has been given Rxs for Labetalol and Nifedipine to take to the pharmacy of her choice.  She's pumping her milk for baby and taking it to NICU, which she will continue to do.  She's rented a hospital grade breast pump to take home for 3 months.  She already owns a regular non-hospital grade pump for continued use beyond 3 months.  Judi was given written and verbal discharge instructions and danger signs.  She verbalized understanding and denied having questions.  She signed the discharge paperwork and went to the NICU to spend time with baby after which she went home.

## 2023-03-15 ENCOUNTER — LACTATION ENCOUNTER (OUTPATIENT)
Age: 28
End: 2023-03-15

## 2023-03-15 NOTE — LACTATION NOTE
This note was copied from a baby's chart.  Lactation consultant to patient room per patient mom's request. Upon arrival, infant at breast in cross cradle hold, but sleeping. Instructed on improved positioning and techniques to keep infant active at the breast. Reviewed instruction given earlier that baby is still premature and expected to be limited in her ability to sustain latch for long.    Mom states she is pumping 8x in 24 hours and averaging 80 ml per pumping session. Praise given for her pumping efforts. Answered questions and gave encouragement and support.

## 2023-03-17 LAB
PATH REPORT.COMMENTS IMP SPEC: NORMAL
PATH REPORT.COMMENTS IMP SPEC: NORMAL
PATH REPORT.FINAL DX SPEC: NORMAL
PATH REPORT.GROSS SPEC: NORMAL
PATH REPORT.MICROSCOPIC SPEC OTHER STN: NORMAL
PATH REPORT.RELEVANT HX SPEC: NORMAL
PHOTO IMAGE: NORMAL

## 2023-03-24 ENCOUNTER — MEDICAL CORRESPONDENCE (OUTPATIENT)
Dept: HEALTH INFORMATION MANAGEMENT | Facility: CLINIC | Age: 28
End: 2023-03-24

## 2023-04-28 ENCOUNTER — LAB REQUISITION (OUTPATIENT)
Dept: LAB | Facility: CLINIC | Age: 28
End: 2023-04-28
Payer: COMMERCIAL

## 2023-04-28 PROCEDURE — G0145 SCR C/V CYTO,THINLAYER,RESCR: HCPCS | Mod: ORL | Performed by: OBSTETRICS & GYNECOLOGY

## 2023-05-02 LAB
BKR LAB AP GYN ADEQUACY: NORMAL
BKR LAB AP GYN INTERPRETATION: NORMAL
BKR LAB AP HPV REFLEX: NORMAL
BKR LAB AP LMP: NORMAL
BKR LAB AP PREVIOUS ABNL DX: NORMAL
BKR LAB AP PREVIOUS ABNORMAL: NORMAL
PATH REPORT.COMMENTS IMP SPEC: NORMAL
PATH REPORT.COMMENTS IMP SPEC: NORMAL
PATH REPORT.RELEVANT HX SPEC: NORMAL

## 2023-07-12 ENCOUNTER — PATIENT OUTREACH (OUTPATIENT)
Dept: CARE COORDINATION | Facility: CLINIC | Age: 28
End: 2023-07-12
Payer: COMMERCIAL

## 2023-07-12 ENCOUNTER — MEDICAL CORRESPONDENCE (OUTPATIENT)
Dept: HEALTH INFORMATION MANAGEMENT | Facility: CLINIC | Age: 28
End: 2023-07-12
Payer: COMMERCIAL

## 2023-07-12 NOTE — PROGRESS NOTES
Clinical Product Navigator RN reviewed chart; patient on payer product coverage.  Review results:   CPN Initial Information Gathering  Referral Source: IP Report    Patient identified via Epic report as at risk patient with no PCP. RN CC spoke with patient. Patient states she does not have a PCP. Patient stated she will schedule an appointment for herself to establish care when she is at the clinic next with her daughter. No further CPN outreaches at this time.    Nivia Baker RN CC  Casual Clinical Product Navigator Coverage

## 2023-08-12 ENCOUNTER — HEALTH MAINTENANCE LETTER (OUTPATIENT)
Age: 28
End: 2023-08-12

## 2023-11-26 ENCOUNTER — OFFICE VISIT (OUTPATIENT)
Dept: FAMILY MEDICINE | Facility: CLINIC | Age: 28
End: 2023-11-26
Payer: COMMERCIAL

## 2023-11-26 VITALS
HEART RATE: 68 BPM | SYSTOLIC BLOOD PRESSURE: 118 MMHG | BODY MASS INDEX: 39.32 KG/M2 | WEIGHT: 215 LBS | TEMPERATURE: 97.8 F | OXYGEN SATURATION: 98 % | DIASTOLIC BLOOD PRESSURE: 81 MMHG

## 2023-11-26 DIAGNOSIS — H65.03 NON-RECURRENT ACUTE SEROUS OTITIS MEDIA OF BOTH EARS: Primary | ICD-10-CM

## 2023-11-26 PROCEDURE — 99203 OFFICE O/P NEW LOW 30 MIN: CPT | Performed by: NURSE PRACTITIONER

## 2023-11-26 RX ORDER — AMOXICILLIN 875 MG
875 TABLET ORAL 2 TIMES DAILY
Qty: 20 TABLET | Refills: 0 | Status: SHIPPED | OUTPATIENT
Start: 2023-11-26 | End: 2023-12-06

## 2023-11-26 RX ORDER — ACETAMINOPHEN AND CODEINE PHOSPHATE 120; 12 MG/5ML; MG/5ML
1 SOLUTION ORAL
COMMUNITY
Start: 2023-10-15 | End: 2024-04-22

## 2023-11-27 NOTE — PROGRESS NOTES
SUBJECTIVE:  Judi Parisi is a 28 year old female who presents with bilateral ear pain for 1 day(s).   Severity: mild   Additional symptoms include congestion.      History of recurrent otitis: no    No past medical history on file.    Social History     Tobacco Use    Smoking status: Every Day   Substance Use Topics    Alcohol use: No    Drug use: Yes     Types: Cocaine       REVIEW OF SYSTEMS  ROS:   Review of systems negative except as stated above.        OBJECTIVE:  /81   Pulse 68   Temp 97.8  F (36.6  C)   Wt 97.5 kg (215 lb)   SpO2 98%   BMI 39.32 kg/m     The right TM is erythematous     The right auditory canal is normal and without drainage, edema or erythema  The left TM is erythematous  The left auditory canal is normal and without drainage, edema or erythema  Oropharynx exam is normal: no lesions, erythema, adenopathy or exudate.  GENERAL: no acute distress  EYES: EOMI,  PERRL, conjunctiva clear  NECK: supple, non-tender to palpation, no adenopathy noted  RESP: lungs clear to auscultation - no rales, rhonchi or wheezes  SKIN: no suspicious lesions or rashes   CV: regular rates and rhythm, normal    ASSESSMENT:  (H65.03) Non-recurrent acute serous otitis media of both ears  (primary encounter diagnosis)  Comment:   Plan: amoxicillin (AMOXIL) 875 MG tablet                PLAN:  Amoxicillin 875mg two times per day for 10 days.  May use acetaminophen, ibuprofen prn.  Return for recheck in 2-4 weeks, sooner if symptoms worsen.      Jacquelyn Byrnes, WHITNEY CNP

## 2024-04-22 ENCOUNTER — ANCILLARY PROCEDURE (OUTPATIENT)
Dept: GENERAL RADIOLOGY | Facility: CLINIC | Age: 29
End: 2024-04-22
Attending: PHYSICIAN ASSISTANT
Payer: COMMERCIAL

## 2024-04-22 ENCOUNTER — OFFICE VISIT (OUTPATIENT)
Dept: FAMILY MEDICINE | Facility: CLINIC | Age: 29
End: 2024-04-22
Payer: COMMERCIAL

## 2024-04-22 VITALS
SYSTOLIC BLOOD PRESSURE: 110 MMHG | HEIGHT: 62 IN | BODY MASS INDEX: 42.14 KG/M2 | OXYGEN SATURATION: 98 % | WEIGHT: 229 LBS | TEMPERATURE: 98.8 F | DIASTOLIC BLOOD PRESSURE: 74 MMHG | RESPIRATION RATE: 12 BRPM | HEART RATE: 66 BPM

## 2024-04-22 DIAGNOSIS — M79.671 RIGHT FOOT PAIN: Primary | ICD-10-CM

## 2024-04-22 DIAGNOSIS — M79.671 RIGHT FOOT PAIN: ICD-10-CM

## 2024-04-22 PROBLEM — E66.9 OBESITY WITH BODY MASS INDEX 30 OR GREATER: Status: ACTIVE | Noted: 2024-04-22

## 2024-04-22 PROBLEM — L71.9 ROSACEA: Status: ACTIVE | Noted: 2022-01-24

## 2024-04-22 PROBLEM — Q27.0 SINGLE UMBILICAL ARTERY: Status: ACTIVE | Noted: 2024-04-22

## 2024-04-22 PROBLEM — Z22.330 CARRIER OF GROUP B STREPTOCOCCUS: Status: ACTIVE | Noted: 2024-04-22

## 2024-04-22 PROCEDURE — 99214 OFFICE O/P EST MOD 30 MIN: CPT | Performed by: PHYSICIAN ASSISTANT

## 2024-04-22 PROCEDURE — 73630 X-RAY EXAM OF FOOT: CPT | Mod: TC | Performed by: RADIOLOGY

## 2024-04-22 NOTE — PROGRESS NOTES
Assessment & Plan     Right foot pain  Right foot pain to the dorsal aspect for the last 3 or 4 months.  Was intermittent but over the last 2 weeks it has become more constant.  She also notes some mild swelling over the area especially after a long day at work.  On exam she does have some mild pain over the second and third navicular bones along with some mild swelling.  There is a small bruise as well.  No warmth noted.  I do not suspect gout or infection at this time.  Would be more worried about tendon issue, cyst, or fracture.  Will start with an x-ray of her foot to rule out any acute fractures.  If x-ray is negative we will order an MRI.  Recommended ibuprofen, Tylenol, ice, and heat in the meantime.  Patient agreed to this plan.  Questions were answered  - XR Foot Right G/E 3 Views; Future        Subjective   Judi is a 28 year old, presenting for the following health issues:  Foot Pain (Top of R foot seems to be injured/bruised but can not think of anything in particular that would have made it feel this way. Has gotten worse over the last 2-3 weeks. Not going away and has a throbbing sensation)      4/22/2024     2:16 PM   Additional Questions   Roomed by Radha Granados   Accompanied by none     Is a pleasant 28-year-old female that presents to the clinic today for evaluation on right foot pain.  Right foot pain developed 3 or 4 months ago at that time it was more intermittent and worse after a long day at work.  Over the last 2 weeks it has become more constant and is daily.  She describes as a throbbing pain.  She had her boyfriend rub the area of pain and she does have a small bruise over the area of pain.  She also notes that it swells at times but does not get warm.  She has not tried any ibuprofen, Tylenol, ice or heat.  No injury that she can think of.    History of Present Illness       Reason for visit:  Right foot  Symptom onset:  More than a month  Symptoms include:  Worsening pain at  "site  Symptom intensity:  Moderate  Symptom progression:  Worsening  Had these symptoms before:  No  What makes it worse:  Working on my feet  What makes it better:  Feet up    She eats 2-3 servings of fruits and vegetables daily.She consumes 1 sweetened beverage(s) daily.She exercises with enough effort to increase her heart rate 30 to 60 minutes per day.  She exercises with enough effort to increase her heart rate 4 days per week.   She is taking medications regularly.      Review of Systems   Musculoskeletal:         Right foot pain x 3-4 months.            Objective    /74 (BP Location: Left arm, Patient Position: Sitting, Cuff Size: Adult Regular)   Pulse 66   Temp 98.8  F (37.1  C) (Oral)   Resp 12   Ht 1.575 m (5' 2\")   Wt 103.9 kg (229 lb)   LMP 04/08/2024 (Approximate)   SpO2 98%   Breastfeeding No   BMI 41.88 kg/m    Body mass index is 41.88 kg/m .  Physical Exam  Vitals and nursing note reviewed.   Constitutional:       General: She is not in acute distress.     Appearance: Normal appearance. She is not ill-appearing, toxic-appearing or diaphoretic.   Musculoskeletal:        Feet:    Feet:      Comments: Mild pain with palpation over the dorsal aspect of right foot along with some mild swelling.  No ecchymosis.  No mass or cyst noted with exam.  She does have pain with flexion and extension of the right ankle.  CMS to the rest of the foot is intact and no weakness to the right foot or ankle  Neurological:      General: No focal deficit present.      Mental Status: She is alert and oriented to person, place, and time.   Psychiatric:         Mood and Affect: Mood normal.         Behavior: Behavior normal.         Thought Content: Thought content normal.         Judgment: Judgment normal.                Signed Electronically by: Gustavo Ruff PA-C    "

## 2024-04-23 DIAGNOSIS — M79.671 RIGHT FOOT PAIN: Primary | ICD-10-CM

## 2024-05-09 ENCOUNTER — HOSPITAL ENCOUNTER (OUTPATIENT)
Dept: MRI IMAGING | Facility: HOSPITAL | Age: 29
Discharge: HOME | End: 2024-05-09
Attending: PHYSICIAN ASSISTANT | Admitting: PHYSICIAN ASSISTANT
Payer: COMMERCIAL

## 2024-05-09 DIAGNOSIS — M79.671 RIGHT FOOT PAIN: ICD-10-CM

## 2024-05-09 PROCEDURE — 73718 MRI LOWER EXTREMITY W/O DYE: CPT | Mod: RT

## 2024-05-10 DIAGNOSIS — M79.671 RIGHT FOOT PAIN: Primary | ICD-10-CM

## 2024-08-26 NOTE — ANESTHESIA PROCEDURE NOTES
Addended by: MAITE MAYORGA on: 8/26/2024 03:37 PM     Modules accepted: Orders     TAP Procedure Note    Pre-Procedure   Staff -        Anesthesiologist:  Deandre Love MD       Performed By: anesthesiologist       Location: OR       Procedure Start/Stop Times: 3/7/2023 2:54 AM and 3/7/2023 3:02 AM       Pre-Anesthestic Checklist: patient identified, IV checked, site marked, risks and benefits discussed, informed consent, monitors and equipment checked, pre-op evaluation, at physician/surgeon's request and post-op pain management  Timeout:       Correct Patient: Yes        Correct Procedure: Yes        Correct Site: Yes        Correct Position: Yes        Correct Laterality: Yes        Site Marked: Yes  Procedure Documentation  Procedure: TAP       Diagnosis: POST OP PAIN CONTROL       Laterality: bilateral       Patient Position: supine       Patient Prep/Sterile Barriers: sterile gloves, mask       Skin prep: Chloraprep       Needle Type: short bevel       Needle Gauge: 20.        Needle Length (Inches): 4        Ultrasound guided       1. Ultrasound was used to identify targeted nerve, plexus, vascular marker, or fascial plane and place a needle adjacent to it in real-time.       2. Ultrasound was used to visualize the spread of anesthetic in close proximity to the above referenced structure.       3. A permanent image is entered into the patient's record.       4. The visualized anatomic structures appeared normal.       5. There were no apparent abnormal pathologic findings.    Assessment/Narrative         The placement was negative for: blood aspirated, painful injection and site bleeding       Paresthesias: No.       Test dose of mL at.         Test dose negative, 3 minutes after injection, for signs of intravascular, subdural, or intrathecal injection.       Bolus given via needle. no blood aspirated via catheter.        Secured via.        Insertion/Infusion Method: Single Shot       Complications: none       Injection made incrementally with aspirations every 5  "mL.    Medication(s) Administered   Bupivacaine 0.25% PF (Infiltration) - Infiltration   40 mL - 3/7/2023 2:54:00 AM  Bupivacaine liposome (Exparel) 1.3% LA inj susp (Infiltration) - Infiltration   20 mL - 3/7/2023 2:54:00 AM  Medication Administration Time: 3/7/2023 2:54 AM     Comments:  Total volume of local anesthetics divided equally between both Left and Right sides during performance of procedure.       FOR [facility] ([facility] [place], [place]) ONLY:   Pain Team Contact information: please page the Pain Team Via [employer]. Search \"Pain\". During daytime hours, please page the attending first. At night please page the resident first.    "

## 2024-10-05 ENCOUNTER — HEALTH MAINTENANCE LETTER (OUTPATIENT)
Age: 29
End: 2024-10-05

## 2025-02-13 ENCOUNTER — OFFICE VISIT (OUTPATIENT)
Dept: MIDWIFE SERVICES | Facility: CLINIC | Age: 30
End: 2025-02-13
Payer: COMMERCIAL

## 2025-02-13 ENCOUNTER — LAB (OUTPATIENT)
Dept: LAB | Facility: CLINIC | Age: 30
End: 2025-02-13
Payer: COMMERCIAL

## 2025-02-13 VITALS
HEIGHT: 62 IN | HEART RATE: 88 BPM | WEIGHT: 246 LBS | SYSTOLIC BLOOD PRESSURE: 120 MMHG | BODY MASS INDEX: 45.27 KG/M2 | DIASTOLIC BLOOD PRESSURE: 80 MMHG

## 2025-02-13 DIAGNOSIS — Z30.431 ENCOUNTER FOR ROUTINE CHECKING OF INTRAUTERINE CONTRACEPTIVE DEVICE (IUD): Primary | ICD-10-CM

## 2025-02-13 DIAGNOSIS — Z30.431 ENCOUNTER FOR ROUTINE CHECKING OF INTRAUTERINE CONTRACEPTIVE DEVICE (IUD): ICD-10-CM

## 2025-02-13 DIAGNOSIS — Z23 NEED FOR PROPHYLACTIC VACCINATION AND INOCULATION AGAINST INFLUENZA: ICD-10-CM

## 2025-02-13 LAB — HCG INTACT+B SERPL-ACNC: <1 MIU/ML

## 2025-02-13 PROCEDURE — 84702 CHORIONIC GONADOTROPIN TEST: CPT

## 2025-02-13 PROCEDURE — 36415 COLL VENOUS BLD VENIPUNCTURE: CPT

## 2025-02-13 RX ORDER — PRENATAL VIT/IRON FUM/FOLIC AC 27MG-0.8MG
1 TABLET ORAL DAILY
Qty: 90 TABLET | Refills: 3 | Status: SHIPPED | OUTPATIENT
Start: 2025-02-13

## 2025-02-13 RX ORDER — COPPER 313.4 MG/1
1 INTRAUTERINE DEVICE INTRAUTERINE ONCE
COMMUNITY
Start: 2024-04-12

## 2025-02-13 NOTE — PROGRESS NOTES
SUBJECTIVE:                                                   Judi Parisi is a 29 year old who presents to clinic today for the following health issue(s):  Patient presents with:  IUD: IUD Check  Imm/Inj: Flu Shot      HPI:  Thinks that she is having her period now 25 it is heavy bleeding but had bleeding last week that was light pink for 3 days and 'unusual'. She would like to have her IUD out today as they are hoping to get pregnant.    Menstrual History: frequency: every 28-30 days  Patient is sexually active  .  Using IUD for contraception.   STI infx testing offered:  Declined    Alcohol Score = does not drink    Problem list and histories reviewed & adjusted, as indicated.  Additional history: as documented.    Patient Active Problem List   Diagnosis    Severe preeclampsia    Single umbilical artery    Seasonal allergic rhinitis    Rosacea    Obesity with body mass index 30 or greater    Carrier of group B Streptococcus    ADHD (attention deficit hyperactivity disorder)     Past Surgical History:   Procedure Laterality Date     SECTION N/A 3/7/2023    Procedure:  SECTION;  Surgeon: Manuel Wiggins MD;  Location: [facility] OR    NO PAST SURGERIES        Social History     Tobacco Use    Smoking status: Some Days     Types: Cigarettes    Smokeless tobacco: Not on file   Substance Use Topics    Alcohol use: No      No data available              Current Outpatient Medications   Medication Sig Dispense Refill    paragard intrauterine copper device 1 each by Intrauterine route once.      Prenatal Vit-Fe Fumarate-FA (PRENATAL MULTIVITAMIN W/IRON) 27-0.8 MG tablet Take 1 tablet by mouth daily. 90 tablet 3    labetalol (NORMODYNE) 200 MG tablet Take 1 tablet (200 mg) by mouth 2 times daily for 30 days 60 tablet 1     No current facility-administered medications for this visit.     Allergies   Allergen Reactions    Adhesive Tape Rash       ROS:   ROS: 10 point ROS neg other  "than the symptoms noted above in the HPI.     OBJECTIVE:     /80 (BP Location: Left arm, Patient Position: Sitting, Cuff Size: Adult Large)   Pulse 88   Ht 1.575 m (5' 2\")   Wt 111.6 kg (246 lb)   LMP  (LMP Unknown)   BMI 44.99 kg/m    Body mass index is 44.99 kg/m .    PHYSICAL EXAM:  See IUD removal note below    In-Clinic Test Results:  No results found for this or any previous visit (from the past 24 hours).    ASSESSMENT/PLAN:                                                        ICD-10-CM    1. Encounter for routine checking of intrauterine contraceptive device (IUD)  Z30.431 hCG Quantitative Pregnancy     Prenatal Vit-Fe Fumarate-FA (PRENATAL MULTIVITAMIN W/IRON) 27-0.8 MG tablet      2. Need for prophylactic vaccination and inoculation against influenza  Z23               IUD Removal:  SUBJECTIVE:    Is a pregnancy test required: Yes. UPT was negative. Will plan BHCG  Was a consent obtained?  Yes, verbal consent to remove the IUD.    Judi Parisi is a 29 year old female,, LMP 25 who presents today for IUD removal.  She has not had problems with the IUD. She requests removal of the IUD because she desires to conceive    Today's PHQ-2 Score:       2024     2:08 PM   PHQ-2 (  [employer])   Q1: Little interest or pleasure in doing things 0   Q2: Feeling down, depressed or hopeless 0   PHQ-2 Score 0   Q1: Little interest or pleasure in doing things Not at all   Q2: Feeling down, depressed or hopeless Not at all   PHQ-2 Score 0       PROCEDURE:    A speculum exam was performed and the cervix was visualized. The IUD string was visualized. Using ring forceps, the string  was grasped and the IUD removed intact.    POST PROCEDURE:    The patient tolerated the procedure well with minimal discomfort. Patient was discharged in stable condition.    Call if bleeding, pain or fever occur., Birth control counseling given., and Pregnancy counseling given, including folic acid supplementation " 800-1000 mg per day.    WHITNEY Cheng CNM

## 2025-03-04 ENCOUNTER — OFFICE VISIT (OUTPATIENT)
Dept: FAMILY MEDICINE | Facility: CLINIC | Age: 30
End: 2025-03-04
Payer: COMMERCIAL

## 2025-03-04 VITALS
HEART RATE: 71 BPM | WEIGHT: 237.1 LBS | DIASTOLIC BLOOD PRESSURE: 84 MMHG | RESPIRATION RATE: 14 BRPM | BODY MASS INDEX: 43.63 KG/M2 | OXYGEN SATURATION: 98 % | SYSTOLIC BLOOD PRESSURE: 118 MMHG | HEIGHT: 62 IN | TEMPERATURE: 98 F

## 2025-03-04 DIAGNOSIS — J01.90 ACUTE SINUSITIS, RECURRENCE NOT SPECIFIED, UNSPECIFIED LOCATION: Primary | ICD-10-CM

## 2025-03-04 PROCEDURE — 3074F SYST BP LT 130 MM HG: CPT | Performed by: PHYSICIAN ASSISTANT

## 2025-03-04 PROCEDURE — 99213 OFFICE O/P EST LOW 20 MIN: CPT | Performed by: PHYSICIAN ASSISTANT

## 2025-03-04 PROCEDURE — 3079F DIAST BP 80-89 MM HG: CPT | Performed by: PHYSICIAN ASSISTANT

## 2025-03-04 NOTE — PROGRESS NOTES
"  Assessment & Plan   Problem List Items Addressed This Visit       Acute sinusitis, recurrence not specified, unspecified location - Primary     Presents today for evaluation of symptoms of sinus drainage and, sore throat that started last Friday.  She has also had green thick nasal discharge.  She is use Mucinex, DayQuil and NyQuil to treat her symptoms.  She had some chills over the weekend but that has resolved.  She is not immunocompromise.  She works as a  in the restaurant business.    Symptoms are most consistent with a viral illness with possibility of a early sinus infection.  We discussed that if she continues to have symptoms 7 to 10 days she should fill the prescription for the Augmentin.  Encouraged to use Flonase nasal spray 2 sprays to nares daily.  She may continue to use Mucinex as needed for cough.  Recheck if symptoms persist or worsen.         Relevant Medications    amoxicillin-clavulanate (AUGMENTIN) 875-125 MG tablet         Nicotine/Tobacco Cessation  She reports that she has been smoking cigarettes. She does not have any smokeless tobacco history on file.  Nicotine/Tobacco Cessation Plan  She has not smoked since start of her illness.      BMI  Estimated body mass index is 43.37 kg/m  as calculated from the following:    Height as of this encounter: 1.575 m (5' 2\").    Weight as of this encounter: 107.5 kg (237 lb 1.6 oz).             Kelsy Lau is a 29 year old, presenting for the following health issues:  Sick visit        3/4/2025     9:15 AM   Additional Questions   Roomed by ALMA UMANZOR   Accompanied by none     History of Present Illness       Reason for visit:  Sick  Symptom onset:  1-3 days ago   She is taking medications regularly.                   Symptoms:  Present (Y/N)  Comment   Fever/Chills IF YES LAST TEMP & TIME/DATE  No     Fatigue  YES     Muscle Aches  No     Eye Irritation: (crusty, goopy/gunky, watery, red, swollen, Discharge that is green, yellow? " "Injury to eye? Etc..)  No     Sneezing  No     Nasal Ruperto/Drainage  YES     Sinus Pressure/Facial Pain  No     Loss of smell or taste  No     Dental pain  No     Sore Throat or Red and swollen tonsils (if yes ask parent/patient if they would like to be swabbed for strep prior to seeing provider)  No     Swollen Glands  No     Ear Pain/Fullness  No     Cough  YES     Wheeze  No     Chest Pain  No     Shortness of breath  YES     Rash  No     Other:(ex. Change in stool, Excessive thirst, Constipation, nausea, diarrhea, abdominal pain, gassiness,  vomiting, fussiness, headache, loss of appetite, lack of sleep or sleeping to much, Yellowing of the skin and whites of the eyes (jaundice), dizziness, petechiae, urinary symptoms, etc..)    YES- lack of appetite        Symptom duration: 4 -5 days ago   Symptom severity mild (1), moderate (2), or severe (3):  Mild    Treatments tried:(Tylenol, Ibuprofen, other OTC meds or home remedies such as honey, cough drops, neb, inhaler etc..)  Tylenol, ibuprofen, Nyquil      Recent exposures/contacts: (ex. RSV, Strep, Pneumonia, Whooping cough,  infectious mononucleosis(Mono), Flu, COVID, Stomach bug, lice etc..)  Unknown              Objective    /84   Pulse 71   Temp 98  F (36.7  C) (Oral)   Resp 14   Ht 1.575 m (5' 2\")   Wt 107.5 kg (237 lb 1.6 oz)   LMP  (LMP Unknown)   SpO2 98%   BMI 43.37 kg/m    Body mass index is 43.37 kg/m .  Physical Exam  Vitals and nursing note reviewed.   Constitutional:       Appearance: Normal appearance. She is obese.   HENT:      Head: Normocephalic.      Right Ear: Tympanic membrane and ear canal normal.      Left Ear: Tympanic membrane and ear canal normal.      Nose:      Comments: Nasal mucosa is boggy and erythematous bilaterally.     Mouth/Throat:      Mouth: Mucous membranes are moist.   Eyes:      Conjunctiva/sclera: Conjunctivae normal.   Cardiovascular:      Rate and Rhythm: Normal rate and regular rhythm.      Heart sounds: " Normal heart sounds.   Pulmonary:      Effort: Pulmonary effort is normal.      Breath sounds: Normal breath sounds.   Lymphadenopathy:      Cervical: No cervical adenopathy.   Neurological:      Mental Status: She is alert.                Signed Electronically by: Laly Thomas PA-C

## 2025-03-04 NOTE — ASSESSMENT & PLAN NOTE
Presents today for evaluation of symptoms of sinus drainage and, sore throat that started last Friday.  She has also had green thick nasal discharge.  She is use Mucinex, DayQuil and NyQuil to treat her symptoms.  She had some chills over the weekend but that has resolved.  She is not immunocompromise.  She works as a  in the restaurant business.    Symptoms are most consistent with a viral illness with possibility of a early sinus infection.  We discussed that if she continues to have symptoms 7 to 10 days she should fill the prescription for the Augmentin.  Encouraged to use Flonase nasal spray 2 sprays to nares daily.  She may continue to use Mucinex as needed for cough.  Recheck if symptoms persist or worsen.

## 2025-06-03 ENCOUNTER — LAB REQUISITION (OUTPATIENT)
Dept: LAB | Facility: CLINIC | Age: 30
End: 2025-06-03

## 2025-06-03 DIAGNOSIS — Z34.90 ENCOUNTER FOR SUPERVISION OF NORMAL PREGNANCY, UNSPECIFIED, UNSPECIFIED TRIMESTER: ICD-10-CM

## 2025-06-03 LAB
BASOPHILS # BLD AUTO: 0 10E3/UL (ref 0–0.2)
BASOPHILS NFR BLD AUTO: 0 %
EOSINOPHIL # BLD AUTO: 0.2 10E3/UL (ref 0–0.7)
EOSINOPHIL NFR BLD AUTO: 2 %
ERYTHROCYTE [DISTWIDTH] IN BLOOD BY AUTOMATED COUNT: 13.2 % (ref 10–15)
EST. AVERAGE GLUCOSE BLD GHB EST-MCNC: 108 MG/DL
HBA1C MFR BLD: 5.4 %
HCT VFR BLD AUTO: 38.4 % (ref 35–47)
HGB BLD-MCNC: 13.1 G/DL (ref 11.7–15.7)
HIV 1+2 AB+HIV1 P24 AG SERPL QL IA: NONREACTIVE
IMM GRANULOCYTES # BLD: 0 10E3/UL
IMM GRANULOCYTES NFR BLD: 0 %
LYMPHOCYTES # BLD AUTO: 2.2 10E3/UL (ref 0.8–5.3)
LYMPHOCYTES NFR BLD AUTO: 21 %
MCH RBC QN AUTO: 30.4 PG (ref 26.5–33)
MCHC RBC AUTO-ENTMCNC: 34.1 G/DL (ref 31.5–36.5)
MCV RBC AUTO: 89 FL (ref 78–100)
MONOCYTES # BLD AUTO: 0.6 10E3/UL (ref 0–1.3)
MONOCYTES NFR BLD AUTO: 6 %
NEUTROPHILS # BLD AUTO: 7.2 10E3/UL (ref 1.6–8.3)
NEUTROPHILS NFR BLD AUTO: 70 %
NRBC # BLD AUTO: 0 10E3/UL
NRBC BLD AUTO-RTO: 0 /100
PLATELET # BLD AUTO: 233 10E3/UL (ref 150–450)
RBC # BLD AUTO: 4.31 10E6/UL (ref 3.8–5.2)
WBC # BLD AUTO: 10.3 10E3/UL (ref 4–11)

## 2025-06-03 PROCEDURE — 87389 HIV-1 AG W/HIV-1&-2 AB AG IA: CPT | Performed by: OBSTETRICS & GYNECOLOGY

## 2025-06-03 PROCEDURE — 87340 HEPATITIS B SURFACE AG IA: CPT | Performed by: OBSTETRICS & GYNECOLOGY

## 2025-06-03 PROCEDURE — 86900 BLOOD TYPING SEROLOGIC ABO: CPT | Performed by: OBSTETRICS & GYNECOLOGY

## 2025-06-03 PROCEDURE — 85025 COMPLETE CBC W/AUTO DIFF WBC: CPT | Performed by: OBSTETRICS & GYNECOLOGY

## 2025-06-03 PROCEDURE — 86592 SYPHILIS TEST NON-TREP QUAL: CPT | Performed by: OBSTETRICS & GYNECOLOGY

## 2025-06-03 PROCEDURE — 87491 CHLMYD TRACH DNA AMP PROBE: CPT | Performed by: OBSTETRICS & GYNECOLOGY

## 2025-06-03 PROCEDURE — 86803 HEPATITIS C AB TEST: CPT | Performed by: OBSTETRICS & GYNECOLOGY

## 2025-06-03 PROCEDURE — 83036 HEMOGLOBIN GLYCOSYLATED A1C: CPT | Performed by: OBSTETRICS & GYNECOLOGY

## 2025-06-04 LAB
ABO + RH BLD: NORMAL
BLD GP AB SCN SERPL QL: NEGATIVE
C TRACH DNA SPEC QL PROBE+SIG AMP: NEGATIVE
HBV SURFACE AG SERPL QL IA: NONREACTIVE
HCV AB SERPL QL IA: NONREACTIVE
N GONORRHOEA DNA SPEC QL NAA+PROBE: NEGATIVE
RPR SER QL: NONREACTIVE
RUBV IGG SERPL QL IA: 6.56 INDEX
RUBV IGG SERPL QL IA: POSITIVE
SPECIMEN EXP DATE BLD: NORMAL
SPECIMEN TYPE: NORMAL

## 2025-08-05 ENCOUNTER — TRANSFERRED RECORDS (OUTPATIENT)
Dept: HEALTH INFORMATION MANAGEMENT | Facility: CLINIC | Age: 30
End: 2025-08-05
Payer: COMMERCIAL

## (undated) DEVICE — SOL WATER IRRIG 1000ML BOTTLE 2F7114

## (undated) DEVICE — SU MONOCRYL+ 4-0 18IN PS2 UND MCP496G

## (undated) DEVICE — PREP CHLORAPREP 26ML TINTED HI-LITE ORANGE 930815

## (undated) DEVICE — SU VICRYL 0 CT-1 27" UND J260H

## (undated) DEVICE — GLOVE BIOGEL PI ULTRATOUCH G SZ 8.0 42180

## (undated) DEVICE — SUTURE VICRYL+ 0 27IN CT-1 UND VCP260H

## (undated) DEVICE — SUCTION MANIFOLD NEPTUNE 2 SYS 1 PORT 702-025-000

## (undated) DEVICE — SOL NACL 0.9% IRRIG 1000ML BOTTLE 2F7124

## (undated) DEVICE — SUTURE VICRYL+ 3-0 27IN CT-1 UND VCP258H

## (undated) DEVICE — DRESSING PRIMAPORE 4 X 3-1/8 66000317

## (undated) DEVICE — Device

## (undated) DEVICE — PACK MINOR SINGLE BASIN SSK3001

## (undated) DEVICE — PLATE GROUNDING ADULT W/CORD 9165L

## (undated) DEVICE — PACK MAJOR BASIN 673

## (undated) DEVICE — DRSG PRIMAPORE 04X11 3/4"

## (undated) DEVICE — CUSTOM PACK C-SECTION LHE

## (undated) RX ORDER — FENTANYL CITRATE 50 UG/ML
INJECTION, SOLUTION INTRAMUSCULAR; INTRAVENOUS
Status: DISPENSED
Start: 2023-03-07

## (undated) RX ORDER — MORPHINE SULFATE 1 MG/ML
INJECTION, SOLUTION EPIDURAL; INTRATHECAL; INTRAVENOUS
Status: DISPENSED
Start: 2023-03-07

## (undated) RX ORDER — FENTANYL CITRATE-0.9 % NACL/PF 10 MCG/ML
PLASTIC BAG, INJECTION (ML) INTRAVENOUS
Status: DISPENSED
Start: 2023-03-07